# Patient Record
Sex: MALE | Race: WHITE | NOT HISPANIC OR LATINO | Employment: OTHER | URBAN - METROPOLITAN AREA
[De-identification: names, ages, dates, MRNs, and addresses within clinical notes are randomized per-mention and may not be internally consistent; named-entity substitution may affect disease eponyms.]

---

## 2017-01-14 ENCOUNTER — COMMUNICATION - HEALTHEAST (OUTPATIENT)
Dept: FAMILY MEDICINE | Facility: CLINIC | Age: 77
End: 2017-01-14

## 2017-01-14 DIAGNOSIS — G47.00 INSOMNIA: ICD-10-CM

## 2017-01-23 ENCOUNTER — COMMUNICATION - HEALTHEAST (OUTPATIENT)
Dept: FAMILY MEDICINE | Facility: CLINIC | Age: 77
End: 2017-01-23

## 2017-01-24 RX ORDER — TRAZODONE HYDROCHLORIDE 100 MG/1
TABLET ORAL
Qty: 180 TABLET | Refills: 0 | Status: SHIPPED | OUTPATIENT
Start: 2017-01-24

## 2017-05-09 ENCOUNTER — COMMUNICATION - HEALTHEAST (OUTPATIENT)
Dept: SCHEDULING | Facility: CLINIC | Age: 77
End: 2017-05-09

## 2018-03-19 ENCOUNTER — MEDICAL CORRESPONDENCE (OUTPATIENT)
Dept: HEALTH INFORMATION MANAGEMENT | Facility: CLINIC | Age: 78
End: 2018-03-19

## 2018-04-04 ENCOUNTER — RADIANT APPOINTMENT (OUTPATIENT)
Dept: CARDIOLOGY | Facility: CLINIC | Age: 78
End: 2018-04-04
Payer: COMMERCIAL

## 2018-04-04 DIAGNOSIS — R06.00 DYSPNEA, UNSPECIFIED: ICD-10-CM

## 2020-03-29 ENCOUNTER — VIRTUAL VISIT (OUTPATIENT)
Dept: FAMILY MEDICINE | Facility: OTHER | Age: 80
End: 2020-03-29

## 2020-03-29 NOTE — PROGRESS NOTES
"Date: 2020 18:15:23  Clinician: Aubrie Singh  Clinician NPI: [id number]  Patient: Aidan Colmenares  Patient : 1940  Patient Address: [address] [place] [address]  Patient Phone: [phone]  Visit Protocol: URI  Patient Summary:  Aidan is a 79 year old ( : 1940 ) male who initiated a Visit for COVID-19 (Coronavirus) evaluation and screening. When asked the question \"Please sign me up to receive news, health information and promotions from [employer].\", Aidan responded \"No\".    Aidan states his symptoms started gradually 3-6 days ago.   His symptoms consist of rhinitis, a headache, myalgia, chills, a sore throat, a cough, nasal congestion, and malaise. He is experiencing mild difficulty breathing with activities but can speak normally in full sentences. Aidan also feels feverish.   Symptom details     Nasal secretions: The color of his mucus is clear.    Cough: Aidan coughs every 5-10 minutes and his cough is more bothersome at night. Phlegm does not come into his throat when he coughs. He believes his cough is caused by post-nasal drip.     Sore throat: Aidan reports having moderate throat pain (4-6 on a 10 point pain scale), does not have exudate on his tonsils, and can swallow liquids. He is not sure if the lymph nodes in his neck are enlarged. A rash has not appeared on the skin since the sore throat started.     Temperature: His current temperature is 100.3 degrees Fahrenheit. Aidan has had a temperature over 100 degrees Fahrenheit for 1-2 days.     Headache: He states the headache is moderate (4-6 on a 10 point pain scale).      Aidan denies having ear pain, teeth pain, facial pain or pressure, and wheezing. He also denies taking antibiotic medication for the symptoms, having recent facial or sinus surgery in the past 60 days, and double sickening (worsening symptoms after initial improvement).   Precipitating events  Within the past week, Aidan has not been exposed to someone with strep throat. " He has not recently been exposed to someone with influenza. Aidan has been in close contact with the following high risk individuals: adults 65 or older.   Pertinent COVID-19 (Coronavirus) information  Aidan has not traveled internationally or to the areas where COVID-19 (Coronavirus) is widespread, including cruise ship travel in the last 14 days before the start of his symptoms.   Aidan has not had a close contact with a laboratory-confirmed COVID-19 patient within 14 days of symptom onset. He also has not had a close contact with a suspected COVID-19 patient within 14 days of symptom onset.   Aidan is not a healthcare worker or a  and does not work in a healthcare facility. He does not live with a healthcare worker.   Pertinent medical history  Aidan had 1 sinus infection within the past year.   Aidan does not need a return to work/school note.   Weight: 190 lbs   Aidan does not smoke or use smokeless tobacco.   Weight: 190 lbs    MEDICATIONS: No current medications, ALLERGIES: NKDA  Clinician Response:  Dear Aidan,  Based on the information provided, you have a viral upper respiratory infection, otherwise known as a cold. Symptoms vary from person to person, but can include sneezing, coughing, a runny nose, sore throat, and headache and range from mild to severe.  Unfortunately, there are no medications that can cure a cold, so treatment is focused on controlling symptoms as much as possible. Most people gradually feel better until symptoms are gone in 1-2 weeks.  Medication information  Because you have a viral infection, antibiotics will not help you get better. Treating a viral infection with antibiotics could actually make you feel worse.  Unless you are allergic to the over-the-counter medication(s) below, I recommend using:       Acetaminophen (Tylenol or store brand) oral tablet. Take 1-2 tablets by mouth every 4-6 hours to help with the discomfort.      Dextro polistirex (Delsym or store brand).  This medication is a cough suppressant that works by decreasing the feeling of needing to cough. Adults and children over 12 years old, take 10 ml by mouth every 12 hours as needed. Children ages 6 - 11, take 5 ml by mouth every 12 hours as needed. Children ages 4 - 5, take 2.5 ml by mouth every 12 hours as needed.     Over-the-counter medications do not require a prescription. Ask the pharmacist if you have any questions.  Self care  Steps you can take to be as comfortable as possible:     Rest.    Drink plenty of fluids.    Take a warm shower to loosen congestion    Use a cool-mist humidifier.    Use throat lozenges.    Suck on frozen items such as popsicles.    Drink hot tea with lemon and honey.    Gargle with warm salt water (1/4 teaspoon of salt per 8 ounce glass of water).    Take a spoonful of honey to reduce your cough.     When to seek care  Please be seen in a clinic or urgent care if new symptoms develop, or symptoms become worse.  Call 911 or go to the emergency room if you feel that your throat is closing off, you suddenly develop a rash, you are unable to swallow fluids, you are drooling, or you are having difficulty breathing.  COVID-19 (Coronavirus) General Information  With the increase in the number of COVID-19 (Coronavirus) cases, we understand you may have some questions. Below is some helpful information on COVID-19 (Coronavirus).  How can I protect myself and others from the COVID-19 (Coronavirus)?  Because there is currently no vaccine to prevent infection, the best way to protect yourself is to avoid being exposed to this virus. Put distance between yourself and other people if COVID-19 (Coronavirus) is spreading in your community. The virus is thought to spread mainly from person-to-person.     Between people who are in close contact with one another (within about 6 about) for a prolonged period (10 minutes or longer).    Through respiratory droplets produced when an infected person coughs  or sneezes.     The CDC recommends the following additional steps to protect yourself and others:     Wash your hands often with soap and water for at least 20 seconds, especially after blowing your nose, coughing, or sneezing; going to the bathroom; and before eating or preparing food.  Use an alcohol-based hand  that contains at least 60 percent alcohol if soap and water are not available.        Avoid touching your eyes, nose and mouth with unwashed hands.    Avoid close contact with people who are sick.    Stay home when you are sick.    Cover your cough or sneeze with a tissue, then throw the tissue in the trash.    Clean and disinfect frequently touched objects and surfaces.     You can help stop COVID-19 (Coronavirus) by knowing the signs and symptoms:     Fever    Cough    Shortness of breath     Contact your healthcare provider if   Develop symptoms   AND   Have been in close contact with a person known to have COVID-19 (Coronavirus) or live in or have recently traveled from an area with ongoing spread of COVID-19 (Coronavirus). Call ahead before you go to a doctor's office or emergency room. Tell them about your recent travel and your symptoms.   For the most up to date information, visit the CDC's website.  Self-monitoring  Self-monitoring means people should monitor themselves for fever by taking their temperatures twice a day and remain alert for a cough or difficulty breathing.  It is important to check your health two times each day for 14 days after a potential exposure to a person with COVID-19 (Coronavirus) or after travel from a location where COVID-19 (Coronavirus) is widespread. If you have been exposed to a person with COVID-19 (Coronavirus), it may take up to 14 days to know if you will get sick. Follow the steps below to check and record your health.     Take your temperature with a thermometer twice a day, once in the morning and once in the evening, and watch for a cough or  difficulty breathing for 14 days.    Write down your temperature and any COVID-19 symptoms you may have: feeling feverish, coughing, or difficulty breathing.    Stay home from work or school.    Do not take public transportation, taxis, or ride-shares.    Avoid crowded places (such as shopping centers and movie theaters) and limit your activities in public.    Keep your distance from others (about 6 feet or 2 meters).    If you get sick with fever, cough, or trouble breathing, contact your healthcare provider and tell them about your recent travel and/or your symptoms.    If you need to seek medical care for other reasons, such as dialysis, call ahead to your doctor and tell them about your recent travel.     Steps to help prevent the spread of COVID-19 (Coronavirus) if you are sick  If you are sick with COVID-19 (Coronavirus) or suspect you are infected with the virus that causes COVID-19 (Coronavirus), follow the steps below to help prevent the disease from spreading&nbsp;to people in your home and community.     Stay home except to get medical care. Home isolation may be started in consultation with your healthcare clinician.    Separate yourself from other people and animals in your home.    Call ahead before visiting your doctor if you have a medical appointment.    Wear a facemask when you are around other people.    Cover your cough and sneezes.    Clean your hands often.    Avoid sharing personal household items.    Clean and disinfect frequently touched objects and surfaces everyday.    You will need to have someone drop off medications or household supplies (if needed) at your house without coming inside or in contact with you or others living in your house.    Monitor your symptoms and seek prompt medical care if your illness is worsening (e.g. Difficulty breathing).    Discontinue home isolation only in consultation with your healthcare provider.     For more detailed and up to date information on what  "to do if you are sick, visit this link: What to Do If You Are Sick With COVID-19.  Do I need to be tested for COVID-19 (Coronavirus)?     Not everyone needs to be tested for COVID-19 (Coronavirus). Decisions on which patients receive testing will be based on the local spread of COVID-19 (Coronavirus) as well as the symptoms. Your healthcare provider will make the final decision on whether you should be tested.    In the meantime, if you have concerns that you may have been exposed, it is reasonable to practice \"social distancing.\"&nbsp; If you are ill with a cold or flu-like illness, please monitor your symptoms and call your healthcare provider if your symptoms worsen.    For more up to date information, visit this link: COVID-19 (Coronavirus) Frequently Asked Questions and Answers.      Diagnosis: Viral URI  Diagnosis ICD: J06.9  Addendum created: March 30 02:54:48, 2020 created by: Dulce Campbell body: Try Robitussin, delsym. I sent a prescription for tessalon pearles to try that as well.  Addendum created: March 30 02:54:00, 2020 created by: Dulce Campbell body: Tessalon pearles 100 mg 3 times a day x 7 days no refills.  "

## 2020-03-31 ENCOUNTER — COMMUNICATION - HEALTHEAST (OUTPATIENT)
Dept: SCHEDULING | Facility: CLINIC | Age: 80
End: 2020-03-31

## 2020-04-11 ENCOUNTER — COMMUNICATION - HEALTHEAST (OUTPATIENT)
Dept: SCHEDULING | Facility: CLINIC | Age: 80
End: 2020-04-11

## 2021-05-25 ENCOUNTER — RECORDS - HEALTHEAST (OUTPATIENT)
Dept: ADMINISTRATIVE | Facility: CLINIC | Age: 81
End: 2021-05-25

## 2021-05-27 ENCOUNTER — RECORDS - HEALTHEAST (OUTPATIENT)
Dept: ADMINISTRATIVE | Facility: CLINIC | Age: 81
End: 2021-05-27

## 2021-05-31 ENCOUNTER — RECORDS - HEALTHEAST (OUTPATIENT)
Dept: ADMINISTRATIVE | Facility: CLINIC | Age: 81
End: 2021-05-31

## 2021-06-01 ENCOUNTER — RECORDS - HEALTHEAST (OUTPATIENT)
Dept: ADMINISTRATIVE | Facility: CLINIC | Age: 81
End: 2021-06-01

## 2021-06-07 NOTE — TELEPHONE ENCOUNTER
[facility] said  [facility]. On April 2nd, diagnosed possible COVID. No temp for three days. He wants to know how long contagious for. Discussed new virus. Go by CDC guidelines. She verbalized understanding.     COVID 19 Nurse Triage Plan/Patient Instructions    Please be aware that novel coronavirus (COVID-19) may be circulating in the community. If you develop symptoms such as fever, cough, or SOB or if you have concerns about the presence of another infection including coronavirus (COVID-19), please contact your health care provider or visit [email or web address].     Disposition/Instructions    Patient to stay at home and follow home care protocol based instructions.    Thank you for limiting contact with others, wearing a simple mask to cover your cough, practice good hand hygiene habits and accessing our virtual services where possible to limit the spread of this virus.    For more information about COVID19 and options for caring for yourself at home, please visit the CDC website at https://www.cdc.gov/coronavirus/2019-ncov/about/steps-when-sick.html  For more options for care at [facility], please visit our website at [email or web address]    For more information, please use the [place] Department of Health ([facility]) COVID-19 Hotlines (Interpreters available):     Health questions: Phone Number: [phone] or [phone] and Hours: 7 a.m. to 7 p.m.    Schools and  questions: Phone Number: [phone] or [phone] and Hours 7 a.m. to 7 p.m.                    Reason for Disposition    HIGH RISK patient (e.g., age > 64 years, diabetes, heart or lung disease, weak immune system)     Seen by his docotr, has conference call set up. Will call back PCP sooner if needed.    Protocols used: CORONAVIRUS (COVID-19) DIAGNOSED OR [id number] 3.30.20

## 2021-06-07 NOTE — TELEPHONE ENCOUNTER
Patient wife calling. She is reporting , that her  has been ill with an URI for 1 week.  She reports his symptoms are Cough and temp. She states   It is a dry cough, and  interupts, that he only coughs when he  Is lying down or drinking cold liquids.    Wife explains that she already went on [email or web address], and reported his symptoms, and was advised to have him take   Cough syrup and tylenol.  She reports, he is not getting any better,and wants something else done.  She is yelling at writer while asking for this, and is emotionally upset because her  she states, is in denial , and is unwilling to deal with this. She is concerned that he really has COVID 19.    She reveals, [facility] and he [facility] not been seen or had care here at  since 2017.    [facility] call [facility], and [facility] since he is a patient there presently.  Wife agreed to do that,    Shannan Brenner RN  [facility] Triage/refill nurse

## 2021-08-24 ENCOUNTER — TRANSFERRED RECORDS (OUTPATIENT)
Dept: HEALTH INFORMATION MANAGEMENT | Facility: CLINIC | Age: 81
End: 2021-08-24

## 2022-08-29 ENCOUNTER — TRANSFERRED RECORDS (OUTPATIENT)
Dept: HEALTH INFORMATION MANAGEMENT | Facility: CLINIC | Age: 82
End: 2022-08-29

## 2022-08-29 LAB
ALT SERPL-CCNC: 22 U/L (ref 13–61)
AST SERPL-CCNC: 19 U/L (ref 15–37)
CHOLESTEROL (EXTERNAL): 116 MG/DL (ref 0–200)
CREATININE (EXTERNAL): 1.2 MG/DL (ref 0.7–1.2)
GLUCOSE (EXTERNAL): 95 MG/DL (ref 74–106)
HDLC SERPL-MCNC: 42 MG/DL
LDL CHOLESTEROL CALCULATED (EXTERNAL): 59 MG/DL
NON HDL CHOLESTEROL (EXTERNAL): 74 MG/DL
POTASSIUM (EXTERNAL): 3.3 MMOL/L (ref 3.5–5)

## 2022-09-01 ENCOUNTER — TRANSFERRED RECORDS (OUTPATIENT)
Dept: HEALTH INFORMATION MANAGEMENT | Facility: CLINIC | Age: 82
End: 2022-09-01

## 2022-09-08 ENCOUNTER — TRANSCRIBE ORDERS (OUTPATIENT)
Dept: OTHER | Age: 82
End: 2022-09-08

## 2022-09-08 DIAGNOSIS — M48.061 SPINAL STENOSIS, LUMBAR REGION, WITHOUT NEUROGENIC CLAUDICATION: Primary | ICD-10-CM

## 2022-09-16 NOTE — TELEPHONE ENCOUNTER
Action 9/16/22 MV 9.49am   Action Taken Records and imaging request faxed to [facility]    9/23/22 MV 12.59pm  1) Recs received from [facility] and sent to scanning   2) imaging request faxed to 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 Neurosurgery:  9/1/22   MEDICATION LIST Received    IMAGING  (FOR ALL VISITS)     MRI (HEAD, NECK, SPINE) Received [facility]:  MRI LumbaR Spine 1/29/21   XRAY (SPINE) *NEUROSURGERY* Received [facility]:  XR Lumbar Spine 5/22/20

## 2022-09-26 ENCOUNTER — TELEPHONE (OUTPATIENT)
Dept: NEUROSURGERY | Facility: CLINIC | Age: 82
End: 2022-09-26

## 2022-09-26 DIAGNOSIS — M48.061 LUMBAR STENOSIS: Primary | ICD-10-CM

## 2022-09-27 ENCOUNTER — OFFICE VISIT (OUTPATIENT)
Dept: NEUROSURGERY | Facility: CLINIC | Age: 82
End: 2022-09-27
Payer: COMMERCIAL

## 2022-09-27 ENCOUNTER — ANCILLARY PROCEDURE (OUTPATIENT)
Dept: GENERAL RADIOLOGY | Facility: CLINIC | Age: 82
End: 2022-09-27
Attending: NEUROLOGICAL SURGERY
Payer: COMMERCIAL

## 2022-09-27 ENCOUNTER — PRE VISIT (OUTPATIENT)
Dept: NEUROSURGERY | Facility: CLINIC | Age: 82
End: 2022-09-27

## 2022-09-27 VITALS
WEIGHT: 195 LBS | DIASTOLIC BLOOD PRESSURE: 76 MMHG | HEART RATE: 53 BPM | HEIGHT: 70 IN | SYSTOLIC BLOOD PRESSURE: 115 MMHG | BODY MASS INDEX: 27.92 KG/M2 | OXYGEN SATURATION: 96 %

## 2022-09-27 DIAGNOSIS — M54.41 CHRONIC BILATERAL LOW BACK PAIN WITH BILATERAL SCIATICA: ICD-10-CM

## 2022-09-27 DIAGNOSIS — G89.29 CHRONIC BILATERAL LOW BACK PAIN WITH BILATERAL SCIATICA: ICD-10-CM

## 2022-09-27 DIAGNOSIS — M48.062 SPINAL STENOSIS OF LUMBAR REGION WITH NEUROGENIC CLAUDICATION: Primary | ICD-10-CM

## 2022-09-27 DIAGNOSIS — M54.42 CHRONIC BILATERAL LOW BACK PAIN WITH BILATERAL SCIATICA: ICD-10-CM

## 2022-09-27 DIAGNOSIS — M48.061 LUMBAR STENOSIS: ICD-10-CM

## 2022-09-27 PROCEDURE — 77073 BONE LENGTH STUDIES: CPT | Performed by: STUDENT IN AN ORGANIZED HEALTH CARE EDUCATION/TRAINING PROGRAM

## 2022-09-27 PROCEDURE — 99203 OFFICE O/P NEW LOW 30 MIN: CPT | Performed by: PHYSICIAN ASSISTANT

## 2022-09-27 PROCEDURE — 72082 X-RAY EXAM ENTIRE SPI 2/3 VW: CPT | Performed by: STUDENT IN AN ORGANIZED HEALTH CARE EDUCATION/TRAINING PROGRAM

## 2022-09-27 RX ORDER — ATORVASTATIN CALCIUM 80 MG/1
TABLET, FILM COATED ORAL
COMMUNITY
Start: 2022-05-01

## 2022-09-27 RX ORDER — GABAPENTIN 300 MG/1
CAPSULE ORAL PRN
COMMUNITY
Start: 2022-07-20 | End: 2022-11-09

## 2022-09-27 RX ORDER — LOSARTAN POTASSIUM 100 MG/1
1 TABLET ORAL DAILY
COMMUNITY
Start: 2022-01-23

## 2022-09-27 RX ORDER — HYDROCHLOROTHIAZIDE 25 MG/1
12.5 TABLET ORAL DAILY
COMMUNITY
Start: 2022-06-12

## 2022-09-27 ASSESSMENT — PAIN SCALES - GENERAL: PAINLEVEL: SEVERE PAIN (7)

## 2022-09-27 NOTE — PATIENT INSTRUCTIONS
Dr Shoemaker has entered orders for an updated MRI of your lumbar spine as well as a referral to [facility] in [place] for initial conservative management.   Members of our scheduling team will assist you in scheduling these appointments.

## 2022-09-27 NOTE — LETTER
Date:September 30, 2022      Provider requested that no letter be sent. Do not send.       [facility]

## 2022-09-27 NOTE — LETTER
"9/27/2022       RE: Sudhakar Colmenares  [address] [address]     Dear Colleague,    Thank you for referring your patient, Sudhakar Colmenares, to the [facility] [place]. Please see a copy of my visit note below.        Neurosurgery Clinic Note    Chief Complaint: low back pain    History of Present Illness:  It was a pleasure to evaluate Sudhakar Colmenares in clinic today at the kind referral of Referred Self, MD    Sudhakar Colmenares is a 82 year old male presenting with complaints of left low back pain extending into his outer hip area.  He describes the pain as a \"bolt\" of pain that causes urinary incontinence and leg weakness. This can occur for no particular reason.  He also describes issues with getting up from the ground.  He needs to get on all fours and walk himself up on either his legs or some other object.  He denies pain in his right leg/hip.  He does notice that bending makes his pain worse, sitting is difficult, laying is best.  When he stands he notes that he feels a heaviness or something pulling his left side down.       He has had three laminectomy surgeries at the L3/4 area in 2002/2003 by [facility], which improved his left hip pain. He has undergone multiple steroid injections at L3/4, the first in Oct 2020 with about 6 months relief, the 2nd in May 2020 w/ marginal relief, the 3rd in Oct 2021 w/o any significant or lasting relief.  He has not had any recent PT.      Review of Systems  Constitutional: Negative for activity change, appetite change, chills, fatigue, fever and unexpected weight change.   HENT: Negative for trouble swallowing.    Eyes: Negative for visual disturbance.   Respiratory: Negative for shortness of breath.    Cardiovascular: Negative for leg swelling.  Gastrointestinal: Negative for abdominal pain, nausea and vomiting.   Endocrine: Negative for cold " intolerance.  Genitourinary: Positive for incontinence, frequency and urgency.   Musculoskeletal: Positive for back pain. Negative for gait problem, neck pain and neck stiffness.  Skin: Negative for color change  Allergic/Immunologic: Negative for immunocompromised state.  Neurological: Negative for tremors, speech difficulty, + left leg weakness, + left tibial numbness Neg. headaches.   Hematological: Does not bruise/bleed easily.   Psychiatric/Behavioral: The patient is not nervous/anxious.     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        IMAGING:  Imaging independently reviewed and discussed with Dr. Shoemaker.    MRI Lumbar 1/29/21 - with mild/mod L4/5 left FS, left L5/S1 disc osteophyte complex w/ facet arthrosis.  There is likely left L4 NR impingement at L4/5 level and possible L5, S1 NR impingement at the L5/S1 level. Prior laminectomy L3/4.    EOS 9/27/22 - no significant malalignment    Resulted Imaging/Labs:  Bone Density:  No results found.    Vitamin D:  Vitamin D Deficiency Screening Results:  No results found for: VITDT  No results found for: JYB296, KXQK230, DMPQ22URPYN, VITD3, D2VIT, D3VIT, DTOT, [id number], [id number], [id number], [id number], [id number], [id number]      Nutritional Status:  Estimated body mass index is 27.44 kg/m  as calculated from the following:    Height as  "of 4/19/16: 1.791 m (5' 10.5\").    Weight as of 4/19/16: 88 kg (194 lb).    No results found for: ALBUMIN    Diabetes Screening:  No results found for: A1C    Nicotine Usage:  No       Physical Exam   There were no vitals taken for this visit.  Constitutional: Oriented to person, place, and time. Appears well-developed and well-nourished. Cooperative. No distress.   HENT:   Head: Normocephalic and atraumatic.   Eyes: Conjunctivae are normal.  Neck: Normal range of motion. Neck supple. No spinous process tenderness and no muscular tenderness present. No tracheal deviation present.  Cardiovascular: Normal rate and regular rhythm.    Pulmonary/Chest: Effort normal and breath sounds normal.  Abdominal: Soft. Bowel sounds are normal. Exhibits no distension. There is no tenderness.   Musculoskeletal:   Cervical flexion-extension range of motion  Lumbar flexion/extension range of motion, +left lower back and hip pain with facet loading.  +left trendelburg sign    Neurological: alert and oriented to person, place, and time.   No cranial nerve deficit   sensory deficit in left tibialis.  Gait normal, symmetrical    Reflex Scores:        Bicep reflexes are 2+ on the right side and 2+ on the left side.       Brachioradialis reflexes are 2+ on the right side and 2+ on the left side.       Patellar reflexes are 2+ on the right side and 1+ on the left side.       Achilles reflexes are 1+ on the right side and 1+ on the left side.    STRENGTH LEFT RIGHT   Deltoid 5 5   Bicep 5 5   Wrist Extensor 5 5   Tricep 5 5   Finger flexion 5 5   Finger abduction 5 5    5 5       Hip Flexion     4+     5   Knee Extension 5 5   Ankle Dorsiflexion 5 5   Extensor Hallucis Longus 5 5   Plantar Flexion 5 5   Foot eversion 5 5   Foot inversion 4 5     No Lhermitte's, No Spurling's  No ankle clonus      Sacroiliac Joint Exam LEFT RIGHT   Liliana Finger Test - -   PSIS tenderness - -   ThighThrust - -   WALTER +left outer hip, tight -   Pelvic " Gapping - -   Pelvic Compression - -   Gaenslen s +left out hip, tight -   Sacral Thrust - -     Tight hamstrings on straight leg test, but negative for back pain L>R.    Skin: Skin is warm, dry and intact. Midline surgical incision is well-healed in mid/low back.  Psychiatric: Normal mood and affect. Speech is normal and behavior is normal.        ASSESSMENT:  Sudhakar Colmenares is a 82 year old male with chronic, recurrent left low back pain w/ extension into the left outer hip s/p 3 back surgeries and multiple steroid injections at the L3/4 level.  His MRI is old (1/2021) and his symptoms have changed since he had the MRI.  He notes bladder incontinence and more severe pain since 1 year ago, as well as difficulty getting up off the floor.  EOS today is negative or any concerning spinal malalignment. He has not had recent PT, injections or other conservative treatment.      PLAN:    -Obtain updated lumbar MRI.  -Referral to PM&R for eval and treatment  -We are happy to to see patient back if there are surgical options in the future.      Patient seen and discussed with Dr. Shoemaker.  Patient agrees with the plan as outlined above.      Katiuska Agarwal PA-C  Department of Neurosurgery  Office: [phone]            Again, thank you for allowing me to participate in the care of your patient.      Sincerely,    Gia Shoemaker MD

## 2022-09-27 NOTE — PROGRESS NOTES
"    Neurosurgery Clinic Note    Chief Complaint: low back pain    History of Present Illness:  It was a pleasure to evaluate Sudhakar Colmenares in clinic today at the kind referral of Referred Self, MD    Sudhakar Colmenares is a 82 year old male presenting with complaints of left low back pain extending into his outer hip area.  He describes the pain as a \"bolt\" of pain that causes urinary incontinence and leg weakness. This can occur for no particular reason.  He also describes issues with getting up from the ground.  He needs to get on all fours and walk himself up on either his legs or some other object.  He denies pain in his right leg/hip.  He does notice that bending makes his pain worse, sitting is difficult, laying is best.  When he stands he notes that he feels a heaviness or something pulling his left side down.       He has had three laminectomy surgeries at the L3/4 area in 2002/2003 by [facility], which improved his left hip pain. He has undergone multiple steroid injections at L3/4, the first in Oct 2020 with about 6 months relief, the 2nd in May 2020 w/ marginal relief, the 3rd in Oct 2021 w/o any significant or lasting relief.  He has not had any recent PT.      Review of Systems  Constitutional: Negative for activity change, appetite change, chills, fatigue, fever and unexpected weight change.   HENT: Negative for trouble swallowing.    Eyes: Negative for visual disturbance.   Respiratory: Negative for shortness of breath.    Cardiovascular: Negative for leg swelling.  Gastrointestinal: Negative for abdominal pain, nausea and vomiting.   Endocrine: Negative for cold intolerance.  Genitourinary: Positive for incontinence, frequency and urgency.   Musculoskeletal: Positive for back pain. Negative for gait problem, neck pain and neck stiffness.  Skin: Negative for color change  Allergic/Immunologic: Negative for immunocompromised state.  Neurological: Negative for tremors, speech difficulty, + " "left leg weakness, + left tibial numbness Neg. headaches.   Hematological: Does not bruise/bleed easily.   Psychiatric/Behavioral: The patient is not nervous/anxious.     Past medical history:  Prostate cancer s/p partial prostatectomy   Left LE vein sacrifice for cardiac bypass  Achilles tendon tear/repair    Past Surgical History:   Procedure Laterality Date    [facility] REPAIR ACHILLES TENDON,PRIMARY      Description: Primary Repair Of Ruptured Achilles Tendon;  Recorded: 01/22/2014;    WI CYSTOSCOPY,REMV URETERAL STONE      Description: Cystoscopy With Removal Of Ureteral Calculus;  Proc Date: 02/01/2004;  Comments: Left Urethral stone  3mm diameter       Social History     Socioeconomic History    Marital status:    Tobacco Use    Smoking status: Never Smoker       family history includes Coronary Artery Disease in his mother.        IMAGING:  Imaging independently reviewed and discussed with Dr. Shoemaker.    MRI Lumbar 1/29/21 - with mild/mod L4/5 left FS, left L5/S1 disc osteophyte complex w/ facet arthrosis.  There is likely left L4 NR impingement at L4/5 level and possible L5, S1 NR impingement at the L5/S1 level. Prior laminectomy L3/4.    EOS 9/27/22 - no significant malalignment    Resulted Imaging/Labs:  Bone Density:  No results found.    Vitamin D:  Vitamin D Deficiency Screening Results:  No results found for: VITDT  No results found for: SCC331, OWAD410, LXIZ88OATQZ, VITD3, D2VIT, D3VIT, DTOT, [id number], [id number], [id number], [id number], [id number], [id number]      Nutritional Status:  Estimated body mass index is 27.44 kg/m  as calculated from the following:    Height as of 4/19/16: 1.791 m (5' 10.5\").    Weight as of 4/19/16: 88 kg (194 lb).    No results found for: ALBUMIN    Diabetes Screening:  No results found for: A1C    Nicotine Usage:  No       Physical Exam   There were no vitals taken for this visit.  Constitutional: Oriented to person, place, and time. Appears well-developed and " well-nourished. Cooperative. No distress.   HENT:   Head: Normocephalic and atraumatic.   Eyes: Conjunctivae are normal.  Neck: Normal range of motion. Neck supple. No spinous process tenderness and no muscular tenderness present. No tracheal deviation present.  Cardiovascular: Normal rate and regular rhythm.    Pulmonary/Chest: Effort normal and breath sounds normal.  Abdominal: Soft. Bowel sounds are normal. Exhibits no distension. There is no tenderness.   Musculoskeletal:   Cervical flexion-extension range of motion  Lumbar flexion/extension range of motion, +left lower back and hip pain with facet loading.  +left trendelburg sign    Neurological: alert and oriented to person, place, and time.   No cranial nerve deficit   sensory deficit in left tibialis.  Gait normal, symmetrical    Reflex Scores:        Bicep reflexes are 2+ on the right side and 2+ on the left side.       Brachioradialis reflexes are 2+ on the right side and 2+ on the left side.       Patellar reflexes are 2+ on the right side and 1+ on the left side.       Achilles reflexes are 1+ on the right side and 1+ on the left side.    STRENGTH LEFT RIGHT   Deltoid 5 5   Bicep 5 5   Wrist Extensor 5 5   Tricep 5 5   Finger flexion 5 5   Finger abduction 5 5    5 5       Hip Flexion     4+     5   Knee Extension 5 5   Ankle Dorsiflexion 5 5   Extensor Hallucis Longus 5 5   Plantar Flexion 5 5   Foot eversion 5 5   Foot inversion 4 5     No Lhermitte's, No Spurling's  No ankle clonus      Sacroiliac Joint Exam LEFT RIGHT   Liliana Finger Test - -   PSIS tenderness - -   ThighThrust - -   WALTER +left outer hip, tight -   Pelvic Gapping - -   Pelvic Compression - -   Gaenslen s +left out hip, tight -   Sacral Thrust - -     Tight hamstrings on straight leg test, but negative for back pain L>R.    Skin: Skin is warm, dry and intact. Midline surgical incision is well-healed in mid/low back.  Psychiatric: Normal mood and affect. Speech is normal and  behavior is normal.        ASSESSMENT:  Sudhakar Colmenares is a 82 year old male with chronic, recurrent left low back pain w/ extension into the left outer hip s/p 3 back surgeries and multiple steroid injections at the L3/4 level.  His MRI is old (1/2021) and his symptoms have changed since he had the MRI.  He notes bladder incontinence and more severe pain since 1 year ago, as well as difficulty getting up off the floor.  EOS today is negative or any concerning spinal malalignment. He has not had recent PT, injections or other conservative treatment.      PLAN:    -Obtain updated lumbar MRI.  -Referral to PM&R for eval and treatment  -We are happy to to see patient back if there are surgical options in the future.      Patient seen and discussed with Dr. Shoemaker.  Patient agrees with the plan as outlined above.      Katiuska Agarwal PA-C  Department of Neurosurgery  Office: [phone]    I have seen this patient with the PA and formulated a plan and agree with this note.  AMP

## 2022-10-01 ENCOUNTER — HOSPITAL ENCOUNTER (OUTPATIENT)
Dept: MRI IMAGING | Facility: CLINIC | Age: 82
Discharge: HOME | End: 2022-10-01
Attending: NEUROLOGICAL SURGERY | Admitting: NEUROLOGICAL SURGERY
Payer: COMMERCIAL

## 2022-10-01 DIAGNOSIS — M48.062 SPINAL STENOSIS OF LUMBAR REGION WITH NEUROGENIC CLAUDICATION: ICD-10-CM

## 2022-10-01 DIAGNOSIS — M54.41 CHRONIC BILATERAL LOW BACK PAIN WITH BILATERAL SCIATICA: ICD-10-CM

## 2022-10-01 DIAGNOSIS — G89.29 CHRONIC BILATERAL LOW BACK PAIN WITH BILATERAL SCIATICA: ICD-10-CM

## 2022-10-01 DIAGNOSIS — M54.42 CHRONIC BILATERAL LOW BACK PAIN WITH BILATERAL SCIATICA: ICD-10-CM

## 2022-10-01 PROCEDURE — 72148 MRI LUMBAR SPINE W/O DYE: CPT

## 2022-10-26 ENCOUNTER — OFFICE VISIT (OUTPATIENT)
Dept: PHYSICAL MEDICINE AND REHAB | Facility: CLINIC | Age: 82
End: 2022-10-26
Attending: NEUROLOGICAL SURGERY
Payer: COMMERCIAL

## 2022-10-26 VITALS
DIASTOLIC BLOOD PRESSURE: 66 MMHG | HEIGHT: 70 IN | BODY MASS INDEX: 28.06 KG/M2 | SYSTOLIC BLOOD PRESSURE: 122 MMHG | WEIGHT: 196 LBS | HEART RATE: 53 BPM

## 2022-10-26 DIAGNOSIS — M47.816 LUMBAR FACET ARTHROPATHY: ICD-10-CM

## 2022-10-26 DIAGNOSIS — M54.16 LUMBAR RADICULAR PAIN: Primary | ICD-10-CM

## 2022-10-26 DIAGNOSIS — M48.061 FORAMINAL STENOSIS OF LUMBAR REGION: ICD-10-CM

## 2022-10-26 DIAGNOSIS — M48.061 STENOSIS OF LATERAL RECESS OF LUMBAR SPINE: ICD-10-CM

## 2022-10-26 PROCEDURE — 99204 OFFICE O/P NEW MOD 45 MIN: CPT | Performed by: PHYSICAL MEDICINE & REHABILITATION

## 2022-10-26 ASSESSMENT — PAIN SCALES - GENERAL: PAINLEVEL: SEVERE PAIN (7)

## 2022-10-26 NOTE — LETTER
10/26/2022         RE: Sudhakar Colmenares  [address] [address]        Dear Colleague,    Thank you for referring your patient, Sudhakar Colmenares, to the [facility]. Please see a copy of my visit note below.    Assessment/Plan:      Sudhakar was seen today for back pain.    Diagnoses and all orders for this visit:    Lumbar radicular pain  -     Physical Therapy Referral; Future  -     Pain Transforaminal Lissy Inj Lmbscrl 1 Lvl Lt; Future    Foraminal stenosis of lumbar region  -     Physical Therapy Referral; Future  -     Pain Transforaminal Lissy Inj Lmbscrl 1 Lvl Lt; Future    Stenosis of lateral recess of lumbar spine  -     Physical Therapy Referral; Future    Lumbar facet arthropathy  -     Physical Therapy Referral; Future    Other orders  -     Spine  Referral         Assessment: Pleasant 82 year old male with a history of coronary artery disease status post CABG,   atrial fibrillation, prostate cancer, status post 3 lumbar discectomies with:    1.  Chronic lumbar spine pain with a year and a half history of left lower extremity radicular pain which is worsening.  This is in an L4/5 distribution.  He has severe left foraminal stenosis L4-5 with left lateral recess stenosis at L4-5 with degenerative disc disease broad-based disc bulge and facet arthropathy.  There is also disc osteophyte complex at L5-S1 resulting in mild to moderate bilateral foraminal stenosis and left lateral recess stenosis.    2.  Severe facet arthropathy bilateral L5-S1 moderate L4-5.      Discussion:    1 I discussed the diagnosis and treatment options with the patient.  [facility] also had interlaminar epidural steroid injections.  He was seen by neurosurgery who recommended further conservative management prior to surgical intervention.  We discussed options of further injections therapy and medications.  He wants to avoid  medications.    2.  Recommend a left L4-5 transforaminal epidural steroid injection  for diagnostic and therapeutic purposes to determine the extent of the L4 nerve involvement and his pain.    3.  Start physical therapy for nerve glides core strengthening stabilization.    4 .  Can consider medial branch blocks for any remaining lumbar spine pain also can consider an EMG of left lower extremity if his symptoms are not improved with the epidural.    5.  Follow-up at his earliest convenience for injection.      It was our pleasure caring for your patient today, if there any questions or concerns please do not hesitate to contact us.      Subjective:   Patient ID: Sudhakar Colmenares is a 82 year old male.    History of Present Illness: Patient presents at the request of Dr. Shoemaker for evaluation of low back pain and left lower extremity radicular pain.  He has a history of 3 lumbar discectomies at [facility] with Dr. Novoa dating back towards 1998 and through early 2000's.  His back pain was fairly well controlled but did have intermittent issues.  About 18 months ago without any new injury began having increased back pain bilateral PSIS left greater than right into the gluteal region down the left lateral anterior thigh with numbness over the anterior shin sense of weakness at the ankle.  He has had some the paresthesias in his anterior shin since his last surgery.  His pain is constant but severely limits him with getting out of a chair prolonged sitting standing or walking better with lying down which is the only thing that improves his pain.  His pain is rated a 9/10 at worst 7/10 today 5/10 at best.    [facility]  I did review notes from neurosurgery with Dr. Shoemaker as well as [facility].  [facility].  He has had 3 lumbar epidurals I can only find records for the most recent in November 2021 L2-3 left paramedian interlaminar epidural steroid  injection.  He reports the initial injection October 2020 was very helpful for give him 6 months relief and less relief with the second injection in the third again was done in November 2021.  Prednisone burst also has been used helpful.       Imaging: MRI report and images were personally reviewed and discussed with the patient.  A plastic model was utilized during the discussion.  MRI of the lumbar spine personally reviewed. Shows prior surgical changes L5-S1.  Degenerative endplate changes throughout the lumbar spine.  Mild disc height loss of the lower lumbar spine.  L5-S1 left paracentral disc protrusion with disc osteophyte complex with lateral recess narrowing severe right moderate left facet arthropathy no central stenosis.  Moderate right and mild to moderate left foraminal stenosis.  L4-5 severe left foraminal stenosis with moderate facet arthropathy and some mild to moderate left lateral recess stenosis with broad-based disc bulge and facet arthropathy.  No central stenosis.  No central stenosis L3-4 with mild facet arthropathy no significant foraminal stenosis.       Review of Systems: Complains of ringing in the ears, change in vision, or bladder control intermittently and tells me he cannot feel  himself urinating but this is intermittent.  Complains of joint pain muscle pain muscle fatigue, skin itching, balance issues falls dizziness, bruising easily and excessive tiredness.  Denies fevers, weight loss, weight gain, eye pain, chest pain, shortness of breath, abdominal pain, nausea vomiting, headaches, painful urination, open wounds, seizures, anxiety depression. Remainder of 12 point review systems negative unless listed above.    Past Medical History:   Diagnosis Date     A-fib (H)      Cancer (H)      Heart disease        Family History   Problem Relation Age of Onset     Coronary Artery Disease Mother          Social History     Socioeconomic History     Marital status:      Spouse name:  "None     Number of children: None     Years of education: None     Highest education level: None   Tobacco Use     Smoking status: Never     Smokeless tobacco: Never     Retired.  Previously in the [employer].  .  2 children.  No tobacco.  Return to alcohol 1 beer per week.    The following portions of the patient's history were reviewed and updated as appropriate: allergies, current medications, past family history, past medical history, past social history, past surgical history and problem list.    Oswestry (FROYLAN) Questionnaire    OSWESTRY DISABILITY INDEX 10/26/2022   Count 10   Sum 23   Oswestry Score (%) 46   Some recent data might be hidden       Neck Disability Index:  No flowsheet data found.       PHQ-2 Score:     PHQ-2 ( 1999 [employer]) 9/27/2022   Q1: Little interest or pleasure in doing things 0   Q2: Feeling down, depressed or hopeless 0   PHQ-2 Score 0                  Objective:   Physical Exam:    /66   Pulse 53   Ht 5' 10\" (1.778 m)   Wt 196 lb (88.9 kg)   BMI 28.12 kg/m    Body mass index is 28.12 kg/m .      General:  Well-appearing male in no acute distress.  Pleasant, cooperative, and interactive throughout the examination and interview.  CV: No lower extremity edema on inspection or paltation.  Lymphatics: No cervical lymphadenopathy palpated. Eyes: sclera clear. Skin: No rashes or lesions seen over the head/neck, hairline, arms, legs, trunk.  Respirations unlabored.  MSK: Gait is antalgic left lower extremity mild.  Able to heel-toe walk without difficulty.  Negative Romberg.  Spine: normal AP curves of the C, T, and L spine.  Moderately reduced lumbar flexion finger to floor testing.  Palpation: Tenderness to palpation over left PSIS and gluteal tissues and sciatic notch.  Extremities: Full range of motion of the elbows, and wrists with no effusions or tenderness to palpation.   Full range of motion of the hips, knees, and ankles from seated with no effusions or tenderness to " palpation.    No hypermobility of the upper or lower extremities.  Neurologic exam: Mental status: Patient is alert and oriented with normal affect.  Attention, knowledge, memory, and language are intact.  Normal coordination throughout the examination.  Reflexes are 2+ and symmetric biceps, triceps, brachioradialis, 2+ right, trace left patellar, and trace bilateral Achilles with equivocal toes and Negative Penny's.  Sensation is decreased light touch bilateral medial malleolus, intact to light touch throughout the remainder of upper and lower extremities bilaterally.  Manual muscle testing reveals 5 out of 5 in the hip flexors, knee flexors/extensors, ankle plantar flexors, ankle  dorsiflexors, and EHL.  Upper extremities: Grossly normal strength . Normal muscle bulk and tone in the arms and legs.    Positive seated straight leg raise on the left.            Again, thank you for allowing me to participate in the care of your patient.        Sincerely,        Lg Wheatley, DO

## 2022-10-26 NOTE — PATIENT INSTRUCTIONS
A physical therapy order was provided for you today.  You will be contacted by physical therapy.  If nobody contacts you within 3 to 5 days, please contact the clinic at [phone].  It will be very important for you to do your physical therapy exercises on a regular basis to decrease your pain and prevent future pain flares.     2. A Left Lumbar epidural has been ordered today. Please schedule this injection at least  2 weeks from now to allow time for insurance prior authorization. On the day of your injection, you cannot be sick or taking antibiotics. If you become sick and are prescribed, please call the clinic so your injection can be rescheduled for once you have completed your antibiotics. You will need to bring a  with you for your injection. If you have any questions or concerns prior to your injection, please do not hesitate to call the nurse navigation line at [phone].     [facility] Injection Requirements    A  is required for all fluoroscopically-guided injections.  Injection appointments may be cancelled if there are signs/symptoms of an active infection or if the patient is being actively treated with antibiotics for a diagnosed infection.  Patients may have their steroid injection cancelled if they have had another steroid injection within 2 weeks.  Diabetic patients will have their blood glucose levels checked the day of their injection and the appointment will be rescheduled if the blood glucose level is 300 or higher.  Patients with allergies to cortisone, local anesthetics, iodine, or contrast dye should contact the Spine Center to further discuss these considerations.  Patients scheduled for medial branch block diagnostic injections should refrain from taking pain medication the day of the procedure.  The medial branch block injection appointment will be rescheduled if the patient's pain rating is not 5/10 or greater at the time of the  procedure.  Patients taking warfarin/Coumadin will have their INR checked the day of the procedure and the procedure may be rescheduled if the INR is greater than 3.0.  Please contact the Spine Center (#[phone]) if you are taking any prescription blood-thinning medications (warfarin, Plavix, Lovenox, Eliquis, Brilinta, Effient, etc.) as special dosing adjustments may need to be made depending on the type of injection you are scheduled to receive.  It is recommended that you delay having your steroid injection if you have received any vaccines within 2 weeks.

## 2022-10-26 NOTE — PROGRESS NOTES
Assessment/Plan:      Sudhakar was seen today for back pain.    Diagnoses and all orders for this visit:    Lumbar radicular pain  -     Physical Therapy Referral; Future  -     Pain Transforaminal Lissy Inj Lmbscrl 1 Lvl Lt; Future    Foraminal stenosis of lumbar region  -     Physical Therapy Referral; Future  -     Pain Transforaminal Lissy Inj Lmbscrl 1 Lvl Lt; Future    Stenosis of lateral recess of lumbar spine  -     Physical Therapy Referral; Future    Lumbar facet arthropathy  -     Physical Therapy Referral; Future    Other orders  -     Spine  Referral         Assessment: Pleasant 82 year old male with a history of coronary artery disease status post CABG,   atrial fibrillation, prostate cancer, status post 3 lumbar discectomies with:    1.  Chronic lumbar spine pain with a year and a half history of left lower extremity radicular pain which is worsening.  This is in an L4/5 distribution.  He has severe left foraminal stenosis L4-5 with left lateral recess stenosis at L4-5 with degenerative disc disease broad-based disc bulge and facet arthropathy.  There is also disc osteophyte complex at L5-S1 resulting in mild to moderate bilateral foraminal stenosis and left lateral recess stenosis.    2.  Severe facet arthropathy bilateral L5-S1 moderate L4-5.      Discussion:    1 I discussed the diagnosis and treatment options with the patient.  [facility] also had interlaminar epidural steroid injections.  He was seen by neurosurgery who recommended further conservative management prior to surgical intervention.  We discussed options of further injections therapy and medications.  He wants to avoid medications.    2.  Recommend a left L4-5 transforaminal epidural steroid injection  for diagnostic and therapeutic purposes to determine the extent of the L4 nerve involvement and his pain.    3.  Start physical therapy for nerve glides core strengthening  stabilization.    4 .  Can consider medial branch blocks for any remaining lumbar spine pain also can consider an EMG of left lower extremity if his symptoms are not improved with the epidural.    5.  Follow-up at his earliest convenience for injection.      It was our pleasure caring for your patient today, if there any questions or concerns please do not hesitate to contact us.      Subjective:   Patient ID: Sudhakar Colmenares is a 82 year old male.    History of Present Illness: Patient presents at the request of Dr. Shoemaker for evaluation of low back pain and left lower extremity radicular pain.  He has a history of 3 lumbar discectomies at [facility] with Dr. Novoa dating back towards 1998 and through early 2000's.  His back pain was fairly well controlled but did have intermittent issues.  About 18 months ago without any new injury began having increased back pain bilateral PSIS left greater than right into the gluteal region down the left lateral anterior thigh with numbness over the anterior shin sense of weakness at the ankle.  He has had some the paresthesias in his anterior shin since his last surgery.  His pain is constant but severely limits him with getting out of a chair prolonged sitting standing or walking better with lying down which is the only thing that improves his pain.  His pain is rated a 9/10 at worst 7/10 today 5/10 at best.    [facility]  I did review notes from neurosurgery with Dr. Shoemaker as well as [facility].  [facility].  He has had 3 lumbar epidurals I can only find records for the most recent in November 2021 L2-3 left paramedian interlaminar epidural steroid injection.  He reports the initial injection October 2020 was very helpful for give him 6 months relief and less relief with the second injection in the third again was done in November 2021.  Prednisone burst also has been used helpful.       Imaging: MRI  report and images were personally reviewed and discussed with the patient.  A plastic model was utilized during the discussion.  MRI of the lumbar spine personally reviewed. Shows prior surgical changes L5-S1.  Degenerative endplate changes throughout the lumbar spine.  Mild disc height loss of the lower lumbar spine.  L5-S1 left paracentral disc protrusion with disc osteophyte complex with lateral recess narrowing severe right moderate left facet arthropathy no central stenosis.  Moderate right and mild to moderate left foraminal stenosis.  L4-5 severe left foraminal stenosis with moderate facet arthropathy and some mild to moderate left lateral recess stenosis with broad-based disc bulge and facet arthropathy.  No central stenosis.  No central stenosis L3-4 with mild facet arthropathy no significant foraminal stenosis.       Review of Systems: Complains of ringing in the ears, change in vision, or bladder control intermittently and tells me he cannot feel  himself urinating but this is intermittent.  Complains of joint pain muscle pain muscle fatigue, skin itching, balance issues falls dizziness, bruising easily and excessive tiredness.  Denies fevers, weight loss, weight gain, eye pain, chest pain, shortness of breath, abdominal pain, nausea vomiting, headaches, painful urination, open wounds, seizures, anxiety depression. Remainder of 12 point review systems negative unless listed above.    Past Medical History:   Diagnosis Date     A-fib (H)      Cancer (H)      Heart disease        Family History   Problem Relation Age of Onset     Coronary Artery Disease Mother          Social History     Socioeconomic History     Marital status:      Spouse name: None     Number of children: None     Years of education: None     Highest education level: None   Tobacco Use     Smoking status: Never     Smokeless tobacco: Never     Retired.  Previously in the [employer].  .  2 children.  No tobacco.  Return to alcohol  "1 beer per week.    The following portions of the patient's history were reviewed and updated as appropriate: allergies, current medications, past family history, past medical history, past social history, past surgical history and problem list.    Oswestry (FROYLAN) Questionnaire    OSWESTRY DISABILITY INDEX 10/26/2022   Count 10   Sum 23   Oswestry Score (%) 46   Some recent data might be hidden       Neck Disability Index:  No flowsheet data found.       PHQ-2 Score:     PHQ-2 ( 1999 [employer]) 9/27/2022   Q1: Little interest or pleasure in doing things 0   Q2: Feeling down, depressed or hopeless 0   PHQ-2 Score 0                  Objective:   Physical Exam:    /66   Pulse 53   Ht 5' 10\" (1.778 m)   Wt 196 lb (88.9 kg)   BMI 28.12 kg/m    Body mass index is 28.12 kg/m .      General:  Well-appearing male in no acute distress.  Pleasant, cooperative, and interactive throughout the examination and interview.  CV: No lower extremity edema on inspection or paltation.  Lymphatics: No cervical lymphadenopathy palpated. Eyes: sclera clear. Skin: No rashes or lesions seen over the head/neck, hairline, arms, legs, trunk.  Respirations unlabored.  MSK: Gait is antalgic left lower extremity mild.  Able to heel-toe walk without difficulty.  Negative Romberg.  Spine: normal AP curves of the C, T, and L spine.  Moderately reduced lumbar flexion finger to floor testing.  Palpation: Tenderness to palpation over left PSIS and gluteal tissues and sciatic notch.  Extremities: Full range of motion of the elbows, and wrists with no effusions or tenderness to palpation.   Full range of motion of the hips, knees, and ankles from seated with no effusions or tenderness to palpation.    No hypermobility of the upper or lower extremities.  Neurologic exam: Mental status: Patient is alert and oriented with normal affect.  Attention, knowledge, memory, and language are intact.  Normal coordination throughout the examination.  Reflexes are " 2+ and symmetric biceps, triceps, brachioradialis, 2+ right, trace left patellar, and trace bilateral Achilles with equivocal toes and Negative Penny's.  Sensation is decreased light touch bilateral medial malleolus, intact to light touch throughout the remainder of upper and lower extremities bilaterally.  Manual muscle testing reveals 5 out of 5 in the hip flexors, knee flexors/extensors, ankle plantar flexors, ankle  dorsiflexors, and EHL.  Upper extremities: Grossly normal strength . Normal muscle bulk and tone in the arms and legs.    Positive seated straight leg raise on the left.

## 2022-10-28 ENCOUNTER — TRANSCRIBE ORDERS (OUTPATIENT)
Dept: OTHER | Age: 82
End: 2022-10-28

## 2022-10-28 DIAGNOSIS — M48.061 SPINAL STENOSIS, LUMBAR REGION, WITHOUT NEUROGENIC CLAUDICATION: Primary | ICD-10-CM

## 2022-11-09 ENCOUNTER — RADIOLOGY INJECTION OFFICE VISIT (OUTPATIENT)
Dept: PHYSICAL MEDICINE AND REHAB | Facility: CLINIC | Age: 82
End: 2022-11-09
Attending: PHYSICAL MEDICINE & REHABILITATION
Payer: COMMERCIAL

## 2022-11-09 VITALS
DIASTOLIC BLOOD PRESSURE: 70 MMHG | SYSTOLIC BLOOD PRESSURE: 124 MMHG | TEMPERATURE: 98.1 F | RESPIRATION RATE: 16 BRPM | HEART RATE: 54 BPM | OXYGEN SATURATION: 97 %

## 2022-11-09 DIAGNOSIS — M54.16 LUMBAR RADICULAR PAIN: ICD-10-CM

## 2022-11-09 DIAGNOSIS — M48.061 FORAMINAL STENOSIS OF LUMBAR REGION: ICD-10-CM

## 2022-11-09 PROCEDURE — 64483 NJX AA&/STRD TFRM EPI L/S 1: CPT | Mod: LT | Performed by: PHYSICAL MEDICINE & REHABILITATION

## 2022-11-09 RX ORDER — METHYLPREDNISOLONE ACETATE 80 MG/ML
INJECTION, SUSPENSION INTRA-ARTICULAR; INTRALESIONAL; INTRAMUSCULAR; SOFT TISSUE
Status: COMPLETED | OUTPATIENT
Start: 2022-11-09 | End: 2022-11-09

## 2022-11-09 RX ORDER — LIDOCAINE HYDROCHLORIDE 10 MG/ML
INJECTION, SOLUTION EPIDURAL; INFILTRATION; INTRACAUDAL; PERINEURAL
Status: COMPLETED | OUTPATIENT
Start: 2022-11-09 | End: 2022-11-09

## 2022-11-09 RX ADMIN — LIDOCAINE HYDROCHLORIDE 2 ML: 10 INJECTION, SOLUTION EPIDURAL; INFILTRATION; INTRACAUDAL; PERINEURAL at 09:06

## 2022-11-09 RX ADMIN — METHYLPREDNISOLONE ACETATE 80 MG: 80 INJECTION, SUSPENSION INTRA-ARTICULAR; INTRALESIONAL; INTRAMUSCULAR; SOFT TISSUE at 09:06

## 2022-11-09 ASSESSMENT — PAIN SCALES - GENERAL
PAINLEVEL: MODERATE PAIN (5)
PAINLEVEL: SEVERE PAIN (7)

## 2022-11-09 NOTE — PATIENT INSTRUCTIONS
Follow-up visit with Dr. Wheatley in 2-4 weeks to discuss injection outcome and determine care plan going forward.       DISCHARGE INSTRUCTIONS    During office hours (8:00 a.m.- 4:00 p.m.) questions or concerns may be answered  by calling [facility] Navigation Nurses at  [phone].  Messages received after hours will be returned the following business day.      In the case of an emergency, please dial 911 or seek assistance at the nearest Emergency Room/Urgent Care facility.     All Patients:    You may experience an increase in your symptoms for the first 2 days (It may take anywhere between 2 days- 2 weeks for the steroid to have maximum effect).    You may use ice on the injection site, as frequently as 20 minutes each hour if needed.    You may take your pain medicine.    You may continue taking your regular medication after your injection. If you have had a Medial Branch Block you may resume pain medication once your pain diary is completed.    You may shower. No swimming, tub bath or hot tub for 48 hours.  You may remove your bandaid/bandage as soon as you are home.    You may resume light activities, as tolerated.    Resume your usual diet as tolerated.    It is strongly advised that you do not drive for 1-3 hours post injection.    If you have had oral sedation:  Do not drive for 8 hours post injection.      If you have had IV sedation:  Do not drive for 24 hours post injection.  Do not operate hazardous machinery or make important personal/business decisions for 24 hours.      POSSIBLE STEROID SIDE EFFECTS (If steroid/cortisone was used for your procedure)    -If you experience these symptoms, it should only last for a short period    Swelling of the legs              Skin redness (flushing)     Mouth (oral) irritation   Blood sugar (glucose) levels            Sweats                    Mood changes  Headache  Sleeplessness  Weakened immune system for up to 14 days, which could increase the risk of  rudy the COVID-19 virus and/or experiencing more severe symptoms of the disease, if exposed.  Decreased effectiveness of the flu vaccine if given within 2 weeks of the steroid.         POSSIBLE PROCEDURE SIDE EFFECTS  -Call the Spine Center if you are concerned  Increased Pain           Increased numbness/tingling      Nausea/Vomiting          Bruising/bleeding at site      Redness or swelling                                              Difficulty walking      Weakness           Fever greater than 100.5    *In the event of a severe headache after an epidural steroid injection that is relieved by lying down, please call the [facility] to speak with a clinical staff member*

## 2022-12-02 ENCOUNTER — OFFICE VISIT (OUTPATIENT)
Dept: PHYSICAL MEDICINE AND REHAB | Facility: CLINIC | Age: 82
End: 2022-12-02
Payer: COMMERCIAL

## 2022-12-02 VITALS — DIASTOLIC BLOOD PRESSURE: 67 MMHG | HEART RATE: 53 BPM | SYSTOLIC BLOOD PRESSURE: 122 MMHG

## 2022-12-02 DIAGNOSIS — M47.816 LUMBAR FACET ARTHROPATHY: ICD-10-CM

## 2022-12-02 DIAGNOSIS — M48.061 FORAMINAL STENOSIS OF LUMBAR REGION: ICD-10-CM

## 2022-12-02 DIAGNOSIS — M54.16 LUMBAR RADICULAR PAIN: Primary | ICD-10-CM

## 2022-12-02 DIAGNOSIS — M48.061 STENOSIS OF LATERAL RECESS OF LUMBAR SPINE: ICD-10-CM

## 2022-12-02 PROCEDURE — 99213 OFFICE O/P EST LOW 20 MIN: CPT | Performed by: PHYSICAL MEDICINE & REHABILITATION

## 2022-12-02 ASSESSMENT — PAIN SCALES - GENERAL: PAINLEVEL: MILD PAIN (2)

## 2022-12-02 NOTE — PROGRESS NOTES
Assessment/Plan:      Sudhakar was seen today for back pain.    Diagnoses and all orders for this visit:    Lumbar radicular pain    Foraminal stenosis of lumbar region    Stenosis of lateral recess of lumbar spine    Lumbar facet arthropathy         Assessment: Pleasant 82 year old male with a history of coronary artery disease status post CABG,   atrial fibrillation, prostate cancer, status post 3 lumbar discectomies with:     1.  Chronic lumbar spine pain with a year and a half history of left lower extremity radicular pain which is worsening.  This is in an L4/5 distribution.  He has severe left foraminal stenosis L4-5 with left lateral recess stenosis at L4-5 with degenerative disc disease broad-based disc bulge and facet arthropathy.  There is also disc osteophyte complex at L5-S1 resulting in mild to moderate bilateral foraminal stenosis and left lateral recess stenosis.  Over 80% improved following a left L4-5 TFESI November 9, 2022.  Has not yet started physical therapy but plans to do so in the near future.  Still has some left-sided low back pain at the PSIS which may represent proximal radicular pain versus facet arthropathy.     2.  Severe facet arthropathy bilateral L5-S1 moderate L4-5.       Discussion:    1.  We discussed the diagnosis and treatment options.  We discussed the option of continuing with physical therapy  or further interventions.  At this time he is doing quite well with over 80% relief of his symptoms and is quite pleased.  He would like to just continue with physical therapy at this time.    2.  Continue with physical therapy.    3.  Follow-up with me as needed.         It was our pleasure caring for your patient today, if there any questions or concerns please do not hesitate to contact us.      Subjective:   Patient ID: Sudhakar Colmenares is a 82 year old male.    History of Present Illness: Patient presents for follow-up of lumbar spine pain left lower extremity radicular pain  following lumbar epidural steroid injection left L4-5.  He is over 80% relief of his symptoms.  He is quite pleased with the injection results.  He still has some pain over the left PSIS and lumbar paraspinals worse with prolonged sitting better with laying down.  Feels that he is walking better or functional feels there is some slight weakness in his left leg overall but much better and he is quite pleased.  Pain is an 8/10 at worst 2/10 today and at best.  Has not yet started physical therapy plans to do so in the near future.    Imaging: MRI lumbar spine reviewed from October 1, 2022.  Lumbar degenerative changes similar to prior MRI postsurgical changes L5-S1 severe right moderate left facet arthropathy mild to moderate right foraminal stenosis.  L4-5 moderate left mild right facet arthropathy moderate severe left and no right foraminal stenosis.  Mild stenosis L3-4 centrally.    Review of Systems: Complains of paresthesias weakness left leg and bladder control issues but nothing new.  No bowel incontinence, no headaches, falls, swallowing issues, fevers or weight loss      Prior interventions:  1.  Left L4-5 TFESI.    Past Medical History:   Diagnosis Date     A-fib (H)      Cancer (H)      Heart disease        The following portions of the patient's history were reviewed and updated as appropriate: allergies, current medications, past family history, past medical history, past social history, past surgical history and problem list.           Objective:   Physical Exam:    /67   Pulse 53   There is no height or weight on file to calculate BMI.      General: Alert and oriented with normal affect. Attention, knowledge, memory, and language are intact. No acute distress.    CV: No lower extremity edema.    Gait:  Nonantalgic  Full range of motion of the hips from seated internal and external rotation.  Sensation is intact to light touch throughout the  lower extremities.  Reflexes are   2+ patellar and 0  Achilles      Manual muscle testing reveals:  Right /Left out of 5     5/5 hip flexors  5/5 knee flexors  5/5 knee extensors  5/5 ankle plantar flexors  5/5 ankle dorsiflexors  5/5  L

## 2022-12-06 ENCOUNTER — HOSPITAL ENCOUNTER (OUTPATIENT)
Dept: PHYSICAL THERAPY | Facility: REHABILITATION | Age: 82
Discharge: HOME | End: 2022-12-06
Payer: COMMERCIAL

## 2022-12-06 DIAGNOSIS — M54.16 LUMBAR RADICULAR PAIN: Primary | ICD-10-CM

## 2022-12-06 DIAGNOSIS — M48.061 STENOSIS OF LATERAL RECESS OF LUMBAR SPINE: ICD-10-CM

## 2022-12-06 DIAGNOSIS — M62.81 MUSCLE WEAKNESS (GENERALIZED): ICD-10-CM

## 2022-12-06 DIAGNOSIS — M47.816 LUMBAR FACET ARTHROPATHY: ICD-10-CM

## 2022-12-06 DIAGNOSIS — M48.061 FORAMINAL STENOSIS OF LUMBAR REGION: ICD-10-CM

## 2022-12-06 PROCEDURE — 97110 THERAPEUTIC EXERCISES: CPT | Mod: GP | Performed by: PHYSICAL THERAPIST

## 2022-12-06 PROCEDURE — 97161 PT EVAL LOW COMPLEX 20 MIN: CPT | Mod: GP | Performed by: PHYSICAL THERAPIST

## 2022-12-06 NOTE — PROGRESS NOTES
"Assessment:  Pt presents to skilled PT with low back with intermittent L LE radiculopathy that started 2 years ago but pt has a long term h/o LBP.  The pt has decreased lumbar ROM with some increased pain.  He has significant loss of LE flexibility.  The pt has decreased LE, core and lumbar strength with decreased awareness.  He has increased tightness and tenderness over L>R lumbar muscles.  The pt does has a + slump test on the L.  He will benefit from skilled PT to address the impairments identified during evaluation.     Exercises:  Date 12/6/22   Exercise    Nu-step or Treadmill     Seated H/S stretch  B x30\"   Seated nerve glide  Bx5   LTR Bx5   Supine hip ABD+ ER  Bx10 + 30\" hold on last rep    Ab set  x5 with 5\" hold    Supine Bridge  Bx10                            "

## 2022-12-06 NOTE — PROGRESS NOTES
"[facility] Rehabilitation Initial Evaluation        12/06/22 0700   General Information   Type of Visit Initial OP Ortho PT Evaluation   Start of Care Date 12/06/22   Referring Physician Dr. Wheatley   Patient/Family Goals Statement less pain   Orders Evaluate and Treat   Date of Order 10/26/22   Medical Diagnosis Lumbar radicular pain  Foraminal stenosis of lumbar region  Stenosis of lateral recess of lumbar spine  Lumbar facet arthropathy   Body Part(s)   Body Part(s) Lumbar Spine/SI   Presentation and Etiology   Pertinent history of current problem (include personal factors and/or comorbidities that impact the POC) Pt reports a long history of low back pain.  Over the past 2 years, he has had worsening pain and sx.  He has had 3 back surgeries to remove \"lateral chips\" in his spine - in 1998, 2002 x2.  He has had 3 injection with some success over the past 2 years.  He had another steriod injection at L3/4 about 4 weeks ago and that has helped quite a bit.  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  He now has pain on the L side of his low back/top pf pelvis.  He gets occasional weakness in the L left LE, but his leg feels stronger. He has the most pain with sitting, standing, bending.  He has not done PT for his back but he used to work out quite a bit.  He no longer goes to the gym.  He was ill with Covid from March-June 2020. He is not doing modalities or anything particular for his back at this time.   Onset date of current episode/exacerbation 10/26/22   Pain quality B. Dull;C. Aching;H. Other   Pain quality comment Leg weakness - approx 1x/day   Prior Level of Function   Prior Level of Function-Mobility WFL   Prior Level of Function-ADLs WFL   Fall Risk Screen   Fall screen completed by PT   Have you fallen 2 or more times in the past year? Yes   Have you fallen and had an injury in the past year? No   Is patient a fall risk? No   Fall screen comments 2 falls were due to L leg weakness prior to this " past injection   Abuse Screen (yes response referral indicated)   Feels Unsafe at Home or Work/School no   Feels Threatened by Someone no   Does Anyone Try to Keep You From Having Contact with Others or Doing Things Outside Your Home? no   Physical Signs of Abuse Present no   Patient needs abuse support services and resources No   Lumbar Spine/SI Objective Findings   Gait/Locomotion WNL   Hamstring Flexibility 40 B   Hip Flexor Flexibility dec   Quadricep Flexibility fair+   Flexion ROM mod loss (39cm fingertip to floor), stiffness   Extension ROM min loss, stretching   Right Side Bending ROM min+ loss, tight   Left Side Bending ROM mod loss, pain L low back   Lumbar ROM Comment R rot = min+ loss, no pain; L rot = min loss, no pain   Hip Flexion (L2) Strength 4+ B   Hip Abduction Strength 4+ B   Hip Adduction Strength 4+ B   Hip Extension Strength 4   Knee Flexion Strength 5   Knee Extension (L3) Strength 5   Ankle Dorsiflexion (L4) Strength 5   Ankle Plantar Flexion (S1) Strength 5   Lumbar/Hip/Knee/Foot Strength Comments L hip ER/IR= 4/5   SLR -   Crossover SLR -   Segmental Mobility mod loss of PA mobility; pain over L SI area   Palpation increased tightness and tenderness over the   Slump Test + L   Posture fair   Planned Therapy Interventions   Planned Therapy Interventions joint mobilization;manual therapy;neuromuscular re-education;ROM;strengthening;stretching   Planned Modality Interventions   Planned Modality Interventions Cryotherapy;Electrical stimulation;Hot packs;TENS   Clinical Impression   Criteria for Skilled Therapeutic Interventions Met yes, treatment indicated   PT Diagnosis LBP with L LE radiculopathy   Influenced by the following impairments decreased ROM and mobility, weakness, tenderness   Functional limitations due to impairments ADL's, sitting, standing, banding   Clinical Presentation Stable/Uncomplicated   Clinical Decision Making (Complexity) Low complexity   Therapy Frequency 1 time/week    Predicted Duration of Therapy Intervention (days/wks) 1x/week for 90 days   Risk & Benefits of therapy have been explained Yes   Patient, Family & other staff in agreement with plan of care Yes   Clinical Impression Comments Assessment:  Pt presents to skilled PT with low back with intermittent L LE radiculopathy that started 2 years ago but pt has a long term h/o LBP.  The pt has decreased lumbar ROM with some increased pain.  He has significant loss of LE flexibility.  The pt has decreased LE, core and lumbar strength with decreased awareness.  He has increased tightness and tenderness over L>R lumbar muscles.  The pt does has a + slump test on the L.  He will benefit from skilled PT to address the impairments identified during evaluation.   Ortho Goal 1   Goal Identifier 1   Goal Description Pt will demonstarte readiness for independent sx management and HEP   Target Date 01/20/23   Ortho Goal 2   Goal Identifier 2   Goal Description Pt will be able to bend over with increased ease and LBP <=4/10   Target Date 01/20/23   Ortho Goal 3   Goal Identifier 2   Goal Description Pt will be able to sit with increased comfort and tolerance of meals, driving, etc with pain <=4/10   Target Date 01/20/23   Ortho Goal 4   Goal Identifier 4   Goal Description Pt will decrease his FROYLAN score to <=15% to demonstrated decreased pain and increased function   Target Date 01/20/23   Total Evaluation Time   PT Eval, Low Complexity Minutes (10816) 20     Amarilis Sainz, PT

## 2022-12-14 ENCOUNTER — HOSPITAL ENCOUNTER (OUTPATIENT)
Dept: PHYSICAL THERAPY | Facility: REHABILITATION | Age: 82
Discharge: HOME | End: 2022-12-14
Payer: COMMERCIAL

## 2022-12-14 DIAGNOSIS — M62.81 MUSCLE WEAKNESS (GENERALIZED): ICD-10-CM

## 2022-12-14 DIAGNOSIS — M48.061 STENOSIS OF LATERAL RECESS OF LUMBAR SPINE: ICD-10-CM

## 2022-12-14 DIAGNOSIS — M54.16 LUMBAR RADICULAR PAIN: Primary | ICD-10-CM

## 2022-12-14 DIAGNOSIS — M48.061 FORAMINAL STENOSIS OF LUMBAR REGION: ICD-10-CM

## 2022-12-14 DIAGNOSIS — M47.816 LUMBAR FACET ARTHROPATHY: ICD-10-CM

## 2022-12-14 PROCEDURE — 97140 MANUAL THERAPY 1/> REGIONS: CPT | Mod: GP | Performed by: PHYSICAL THERAPIST

## 2022-12-14 PROCEDURE — 97110 THERAPEUTIC EXERCISES: CPT | Mod: GP | Performed by: PHYSICAL THERAPIST

## 2022-12-14 NOTE — PROGRESS NOTES
"Outpatient Physical Therapy Daily Progress Note    Patient: Sudhakar Colmenares  : 1940  Start of Care: 24  Date of Visit: 2022  Visit: 2    Referring Provider: Dr. Wheatley     Therapy Diagnosis: LBP with L LE radiculopathy     Client Self Report:   Pt reports that he did his exercises every other day.  He was sore in his hips from the exercises.  He shoveled already this AM.      Objective Measurements:    Decreased core awareness    Increased tightness and tenderness   L lumbar paraspinals = moderate       Assessment:  Pt returns to skilled PT with low back with intermittent L LE radiculopathy that started 2 years ago but pt has a long term h/o LBP.  The pt has decreased lumbar ROM with some increased pain.  He has significant loss of LE flexibility.  The pt has decreased LE, core and lumbar strength with decreased awareness.  He has increased tightness and tenderness over L>R lumbar muscles.  The pt does has a + slump test on the L.  Since starting PT, his sx have not changed much but he is sore from the exercises.  PT did initiate a small amount of MT to the L lumbar muscles.  He will continue to benefit from skilled PT to address the impairments identified during evaluation.    Goals:  See daily doc     Plan:  Continue therapy per current plan of care.      Today's Treatment:       Exercises:  Date 22   Exercise     Nu-step or Treadmill  x5 min RL:5     Seated H/S stretch  B 2x30\"  B x30\"   Seated nerve glide  Changed to supine to avoid neck pain  Bx10  Bx5   LTR Bx10  Bx5   Supine hip ABD+ ER   Bx10 + 30\" hold on last rep    Ab set   x5 with 5\" hold    Supine Bridge   Bx10                                 "

## 2023-01-10 ENCOUNTER — HOSPITAL ENCOUNTER (OUTPATIENT)
Dept: PHYSICAL THERAPY | Facility: REHABILITATION | Age: 83
Discharge: HOME | End: 2023-01-10
Payer: COMMERCIAL

## 2023-01-10 DIAGNOSIS — M62.81 MUSCLE WEAKNESS (GENERALIZED): ICD-10-CM

## 2023-01-10 DIAGNOSIS — M54.16 LUMBAR RADICULAR PAIN: Primary | ICD-10-CM

## 2023-01-10 DIAGNOSIS — M48.061 STENOSIS OF LATERAL RECESS OF LUMBAR SPINE: ICD-10-CM

## 2023-01-10 DIAGNOSIS — M48.061 FORAMINAL STENOSIS OF LUMBAR REGION: ICD-10-CM

## 2023-01-10 DIAGNOSIS — M47.816 LUMBAR FACET ARTHROPATHY: ICD-10-CM

## 2023-01-10 PROCEDURE — 97140 MANUAL THERAPY 1/> REGIONS: CPT | Mod: GP | Performed by: PHYSICAL THERAPIST

## 2023-01-10 PROCEDURE — 97110 THERAPEUTIC EXERCISES: CPT | Mod: GP | Performed by: PHYSICAL THERAPIST

## 2023-01-10 NOTE — PROGRESS NOTES
"Outpatient Physical Therapy Daily Progress Note    Patient: Sudhakar Colmenares  : 1940  Start of Care: 24  Date of Visit: 2022  Visit: 2    Referring Provider: Dr. Wheatley     Therapy Diagnosis: LBP with L LE radiculopathy     Client Self Report:   Pt reports that his back did not feel very good because he did so much snow shoveling.  He did do his stretches and exercises after shoveling and that did help.   He does have less sharp pain in the L hip.  He does still feel like his L leg is weak and like it could give out sometimes.  He has been doing his HEP.  He did feel some relief from the MT last time.     Objective Measurements:    Decreased core awareness    Increased tightness and tenderness   L lumbar paraspinals = moderate       Assessment:  Pt continued in skilled PT to address his low back with intermittent L LE radiculopathy that started 2 years ago but pt has a long term h/o LBP.  The pt has decreased lumbar ROM with some increased pain.  He has significant loss of LE flexibility.  The pt has decreased LE, core and lumbar strength with decreased awareness.  He has increased tightness and tenderness over L>R lumbar muscles.  The pt does has a + slump test on the L.  Since starting PT, his sx have not changed much but he has been missing some of the key exercises in his HEP.  PT and pt reviewed his HEP and corrected exercise technique and reps.  PT did continue a small amount of MT to the L lumbar muscles.  He will continue to benefit from skilled PT to address the impairments identified during evaluation.    Goals:  See daily doc     Plan:  Continue therapy per current plan of care.      Today's Treatment:       Exercises:  Date 1/10/23 12/14/22 12/6/22   Exercise      Nu-step or Treadmill  x5 min RL:5  x5 min RL:5     Seated H/S stretch  Seated Bx30\" B 2x30\"  B x30\"   Seated nerve glide  Supine manual H/S stretch Bx60\"   N. dora R, L x10 Changed to supine to avoid neck pain  Bx10  Bx5 " "  LTR Bx10 Bx10  Bx5   Supine hip ABD+ ER    Bx10 + 30\" hold on last rep    Ab set  Ab set + march B 2x5 - major cues   x5 with 5\" hold    Supine Bridge  Bx12 - cues for reps  Bx10                                      "

## 2023-01-19 ENCOUNTER — HOSPITAL ENCOUNTER (OUTPATIENT)
Dept: PHYSICAL THERAPY | Facility: REHABILITATION | Age: 83
Discharge: HOME | End: 2023-01-19
Payer: COMMERCIAL

## 2023-01-19 DIAGNOSIS — M54.16 LUMBAR RADICULAR PAIN: Primary | ICD-10-CM

## 2023-01-19 DIAGNOSIS — M62.81 MUSCLE WEAKNESS (GENERALIZED): ICD-10-CM

## 2023-01-19 DIAGNOSIS — M47.816 LUMBAR FACET ARTHROPATHY: ICD-10-CM

## 2023-01-19 DIAGNOSIS — M48.061 FORAMINAL STENOSIS OF LUMBAR REGION: ICD-10-CM

## 2023-01-19 DIAGNOSIS — M48.061 STENOSIS OF LATERAL RECESS OF LUMBAR SPINE: ICD-10-CM

## 2023-01-19 PROCEDURE — 97140 MANUAL THERAPY 1/> REGIONS: CPT | Mod: GP | Performed by: PHYSICAL THERAPIST

## 2023-01-19 PROCEDURE — 97110 THERAPEUTIC EXERCISES: CPT | Mod: GP | Performed by: PHYSICAL THERAPIST

## 2023-01-19 NOTE — PROGRESS NOTES
Outpatient Physical Therapy Daily Progress Note    Patient: Sudhakar Colmenares  : 1940  Start of Care: 22  Date of Visit: 2023  Visit: 4    Referring Provider: Dr. Wheatley     Therapy Diagnosis: LBP with L LE radiculopathy     Client Self Report: .   Pt reports that he has had a set back over the past few days.  A little better today, but he has been already out moving the snow.    Today, he is feeling stiffness in his low back and between his shoulder blades.  He has some minor soreness in his hip and then pain into his L knee while on the nu-step today.  He is having some more frequent neck pain as well, but he has felt increased stressed.  He has been doing his HEP daily and they do help him feel some better.     Objective Measurements:    Decreased core awareness    Increased tightness and tenderness   L lumbar paraspinals = moderate   L gluteals = moderate       Assessment:  Pt continues in skilled PT to address his low back with intermittent L LE radiculopathy that started 2 years ago but pt has a long term h/o LBP.  The pt has decreased lumbar ROM with some increased pain.  He has continued loss of LE flexibility.  The pt has decreased LE, core and lumbar strength with decreased awareness.  He has increased tightness and tenderness over L>R lumbar muscles.  The pt does has a + slump test on the L.  Since starting PT, his sx have been up and down depending on activity level, etc.  PT and pt reviewed his HEP and corrected exercise technique and reps.  PT did continue a small amount of MT to the L lumbar muscles.  He will continue to benefit from skilled PT to address the impairments identified during evaluation.    Goals:  See daily doc - updated 23    Plan:  Continue therapy per current plan of care.      Today's Treatment:       Exercises:  Date 1/19/23 1/10/23 12/14/22 12/6/22   Exercise       Nu-step or Treadmill  x4 min RL:5   Stopped d/t knee p x5 min RL:5  x5 min RL:5     Seated H/S  "stretch   Seated Bx30\" B 2x30\"  B x30\"   Seated nerve glide  Supine manual H/S stretch B 2x30\"  N glide R, L x20  Supine hip flexor/quad stretch off table R, L x60\" Supine manual H/S stretch Bx60\"   N. glifge R, L x10 Changed to supine to avoid neck pain  Bx10  Bx5   LTR  Bx10 Bx10  Bx5   Supine hip ABD+ ER  Bx10 + 30\" hold    Bx10 + 30\" hold on last rep    Ab set  Standing ab set x5 with 10\" hold  Supine ab set+ march Bx10 - cues for speed  Ab set + march B 2x5 - major cues   x5 with 5\" hold    Supine Bridge  Reviewed for reps Bx12 - cues for reps  Bx10    Standing hip ABD  Bx20 alt                                       "

## 2023-01-26 ENCOUNTER — HOSPITAL ENCOUNTER (OUTPATIENT)
Dept: PHYSICAL THERAPY | Facility: REHABILITATION | Age: 83
Discharge: HOME | End: 2023-01-26
Payer: COMMERCIAL

## 2023-01-26 DIAGNOSIS — M54.16 LUMBAR RADICULAR PAIN: Primary | ICD-10-CM

## 2023-01-26 DIAGNOSIS — M62.81 MUSCLE WEAKNESS (GENERALIZED): ICD-10-CM

## 2023-01-26 DIAGNOSIS — M47.816 LUMBAR FACET ARTHROPATHY: ICD-10-CM

## 2023-01-26 DIAGNOSIS — M48.061 FORAMINAL STENOSIS OF LUMBAR REGION: ICD-10-CM

## 2023-01-26 DIAGNOSIS — M48.061 STENOSIS OF LATERAL RECESS OF LUMBAR SPINE: ICD-10-CM

## 2023-01-26 PROCEDURE — 97140 MANUAL THERAPY 1/> REGIONS: CPT | Mod: GP | Performed by: PHYSICAL THERAPIST

## 2023-01-26 PROCEDURE — 97110 THERAPEUTIC EXERCISES: CPT | Mod: GP | Performed by: PHYSICAL THERAPIST

## 2023-01-26 NOTE — PROGRESS NOTES
Outpatient Physical Therapy Daily Progress Note    Patient: Sudhakar Colmenares  : 1940  Start of Care: 22  Date of Visit: 2023  Visit: 5    Referring Provider: Dr. Wheatley     Therapy Diagnosis: LBP with L LE radiculopathy     Client Self Report: .   Pt reports that he has been having pain in his L shoulder blade over the past few weeks.  The pain is getting worse not better and really limits his ability to sleep.  At this point, the shoulder blade pain is worse than than the back.  He has had some L LE weakness.  No pain in the low back.  He has been mostly consistent with his HEP.       Objective Measurements:    Decreased core awareness    Increased tightness and tenderness   L lumbar paraspinals = moderate   L gluteals = moderate       Assessment:  Pt continues in skilled PT to address his low back with intermittent L LE radiculopathy that started 2 years ago but pt has a long term h/o LBP.  The pt  Now has increasing lumbar ROM with decreasing pain.  He has continued loss of LE flexibility.  The pt has decreased LE, core and lumbar strength with decreased awareness.  He has continued tightness and tenderness over L>R lumbar muscles as well as some new additional tension in his L scapular and thoracic area.  The pt does has a + slump test on the L.  Since starting PT, the lumbar radiculopathy sx having been improving.  PT and pt reviewed his HEP and corrected exercise technique and reps.  PT did continue a small amount of MT to the L lumbar, thoracic and scapular muscles.  He will continue to benefit from skilled PT to address the impairments identified during evaluation.    Goals:  See daily doc - updated 23    Plan:  Continue therapy per current plan of care.      Today's Treatment:       Exercises:  Date 1/26/23 1/19/23 1/10/23 12/14/22 12/6/22   Exercise        Nu-step or Treadmill  x5 min RL:5  x4 min RL:5   Stopped d/t knee p x5 min RL:5  x5 min RL:5     Seated H/S stretch  Supine H/S  "stretch Bx30\"  Seated Bx30\" B 2x30\"  B x30\"   Seated nerve glide  Supine nerve glide R, L x10     Supine manual hip flexor/quad stretch Bx30\"  Supine manual H/S stretch B 2x30\"  N glide R, L x20  Supine hip flexor/quad stretch off table R, L x60\" Supine manual H/S stretch Bx60\"   N. glifge R, L x10 Changed to supine to avoid neck pain  Bx10  Bx5   LTR   Bx10 Bx10  Bx5   Supine hip ABD+ ER   Bx10 + 30\" hold    Bx10 + 30\" hold on last rep    Ab set  Standing ab set + march 2x10 Standing ab set x5 with 10\" hold  Supine ab set+ march Bx10 - cues for speed  Ab set + march B 2x5 - major cues   x5 with 5\" hold    Supine Bridge  Bx10 with 10\" holds  Reviewed for reps Bx12 - cues for reps  Bx10    Standing hip ABD  B 2x10  Bx20 alt       Mini Squats with ab set 2x10                                   "

## 2023-02-02 ENCOUNTER — HOSPITAL ENCOUNTER (OUTPATIENT)
Dept: PHYSICAL THERAPY | Facility: REHABILITATION | Age: 83
Discharge: HOME | End: 2023-02-02
Payer: COMMERCIAL

## 2023-02-02 DIAGNOSIS — M54.16 LUMBAR RADICULAR PAIN: Primary | ICD-10-CM

## 2023-02-02 DIAGNOSIS — M48.061 STENOSIS OF LATERAL RECESS OF LUMBAR SPINE: ICD-10-CM

## 2023-02-02 DIAGNOSIS — M47.816 LUMBAR FACET ARTHROPATHY: ICD-10-CM

## 2023-02-02 DIAGNOSIS — M48.061 FORAMINAL STENOSIS OF LUMBAR REGION: ICD-10-CM

## 2023-02-02 DIAGNOSIS — M62.81 MUSCLE WEAKNESS (GENERALIZED): ICD-10-CM

## 2023-02-02 PROCEDURE — 97110 THERAPEUTIC EXERCISES: CPT | Mod: GP | Performed by: PHYSICAL THERAPIST

## 2023-02-02 PROCEDURE — 97140 MANUAL THERAPY 1/> REGIONS: CPT | Mod: GP | Performed by: PHYSICAL THERAPIST

## 2023-02-02 NOTE — PROGRESS NOTES
Outpatient Physical Therapy Daily Progress Note    Patient: Sudhakar Colmenares  : 1940  Start of Care: 22  Date of Visit: 2023  Visit: 6    Referring Provider: Dr. Wheatley     Therapy Diagnosis: LBP with L LE radiculopathy     Client Self Report: .   Pt reports that he is sore all over this week.  He notes that he did a bunch of shoveling this week to  after his dog.  He also suspects the cold does not help his pain.  Today, he reports increased pain in his left low back and hip and down to the knee.   He was compliant with his HEP.   He does feel benefit from MT in each session.     Objective Measurements:    Decreased core awareness    Increased tightness and tenderness   L lumbar paraspinals = moderate   L gluteals = moderate       Assessment:  Pt continues in skilled PT to address his low back with intermittent L LE radiculopathy that started 2 years ago but pt has a long term h/o LBP.  Over the past week, the pt has had increased soreness all over but particularly in his L low back, glute and LE.  This could be activity related.  The pt was demonstrating  increasing lumbar ROM with decreasing pain, but is slightly more restricted today.  He has continued loss of LE flexibility.  The pt has continued LE, core and lumbar weakness, but with improving awareness.  He has continued tightness and tenderness over L>R lumbar muscles as well as some new additional tension in his L scapular and thoracic area.  The pt does has a + slump test on the L.  Since starting PT, the lumbar radiculopathy sx having been improving with a minor set back this week.  PT and pt reviewed his HEP with additional focus on stretching and mobility today.  PT did continue MT to the L lumbar, glute and LE muscles with great reduction in pain. He will continue to benefit from skilled PT to address the impairments identified during evaluation.    Goals:  See daily doc - updated 23    Plan:  Continue therapy per current  "plan of care.      Today's Treatment:       Exercises:  Date 2/2/23 1/26/23 1/19/23 1/10/23 12/14/22 12/6/22   Exercise         Nu-step or Treadmill  x5 min RL:5  x5 min RL:5  x4 min RL:5   Stopped d/t knee p x5 min RL:5  x5 min RL:5     Seated H/S stretch  B 2x30\" on step  Supine H/S stretch Bx30\"  Seated Bx30\" B 2x30\"  B x30\"   Seated nerve glide  Standing - attempts x30\" B Supine nerve glide R, L x10     Supine manual hip flexor/quad stretch Bx30\"  Supine manual H/S stretch B 2x30\"  N glide R, L x20  Supine hip flexor/quad stretch off table R, L x60\" Supine manual H/S stretch Bx60\"   N. glifge R, L x10 Changed to supine to avoid neck pain  Bx10  Bx5   LTR Bx10   Bx10 Bx10  Bx5   Supine hip ABD+ ER  Bx10+30\"  Bx10 + 30\" hold    Bx10 + 30\" hold on last rep    Ab set  Ab set + march Bx10 Standing ab set + march 2x10 Standing ab set x5 with 10\" hold  Supine ab set+ march Bx10 - cues for speed  Ab set + march B 2x5 - major cues   x5 with 5\" hold    Supine Bridge  Bx10 with 10\" hold  Bx10 with 10\" holds  Reviewed for reps Bx12 - cues for reps  Bx10    Standing hip ABD   B 2x10  Bx20 alt       Mini Squats with ab set  2x10                 Cat/cow x10 + child's pose x30\" after MT                     "

## 2023-02-09 ENCOUNTER — HOSPITAL ENCOUNTER (OUTPATIENT)
Dept: PHYSICAL THERAPY | Facility: REHABILITATION | Age: 83
Discharge: HOME | End: 2023-02-09
Payer: COMMERCIAL

## 2023-02-09 DIAGNOSIS — M48.061 FORAMINAL STENOSIS OF LUMBAR REGION: ICD-10-CM

## 2023-02-09 DIAGNOSIS — M54.16 LUMBAR RADICULAR PAIN: Primary | ICD-10-CM

## 2023-02-09 DIAGNOSIS — M48.061 STENOSIS OF LATERAL RECESS OF LUMBAR SPINE: ICD-10-CM

## 2023-02-09 DIAGNOSIS — M47.816 LUMBAR FACET ARTHROPATHY: ICD-10-CM

## 2023-02-09 DIAGNOSIS — M62.81 MUSCLE WEAKNESS (GENERALIZED): ICD-10-CM

## 2023-02-09 PROCEDURE — 97110 THERAPEUTIC EXERCISES: CPT | Mod: GP | Performed by: PHYSICAL THERAPIST

## 2023-02-09 PROCEDURE — 97140 MANUAL THERAPY 1/> REGIONS: CPT | Mod: GP | Performed by: PHYSICAL THERAPIST

## 2023-02-09 NOTE — PROGRESS NOTES
Outpatient Physical Therapy Daily Progress Note    Patient: Sudhakar Colmenares  : 1940  Start of Care: 24  Date of Visit: 2023  Visit: 7    Referring Provider: Dr. Wheatley     Therapy Diagnosis: LBP with L LE radiculopathy     Client Self Report: .   Pt reports that his low back and L hip sx are doing very well.  He has had hardly any pain in this area for the past week.  He continues to have a burning pain at his shoulder blades.  He will be seeing his provider next week and will discuss this area.   He has not used any heat or ice in this area. He is doing his HEP.       Objective Measurements:    Increasing core and posture awareness    Continued tightness and tenderness   L lumbar paraspinals = moderate   L gluteals = moderate       Assessment:  Pt continues in skilled PT to address his low back with intermittent L LE radiculopathy that started 2 years ago but pt has a long term h/o LBP.   Over the past week, the pt has felt much better with none to minimal pain in his low back and L glute/hip.  However, he continues to have his 'new' pain in his shoulder blades.   He now has increasing lumbar ROM with decreasing pain.  He does have continued loss of LE flexibility.  The pt has been working to increase his LE, core and lumbar strength as well as  improving awareness.  He has continued tightness and tenderness over L>R lumbar muscles as well as some new additional tension in his L scapular and thoracic area.  The pt has less has a + slump test on the L.  PT and pt reviewed his HEP with continued focus on the core, mobility and posture.  PT did continue MT to the L lumbar, glute and LE muscles with great reduction in pain. He will continue to benefit from skilled PT to address the impairments identified during evaluation.    Goals:  See daily doc - updated 23    Plan:  Continue therapy per current plan of care.      Today's Treatment:       Exercises:  Date 2/9/23 2/2/23 1/26/23 1/19/23 1/10/23  "12/14/22 12/6/22   Exercise          Nu-step or Treadmill  x6 min RL;5 x5 min RL:5  x5 min RL:5  x4 min RL:5   Stopped d/t knee p x5 min RL:5  x5 min RL:5     Seated H/S stretch  On bloster -seated  B 2x30\"\" B 2x30\" on step  Supine H/S stretch Bx30\"  Seated Bx30\" B 2x30\"  B x30\"   Seated nerve glide  Bx10 on bolster - PT assisted  Standing - attempts x30\" B Supine nerve glide R, L x10     Supine manual hip flexor/quad stretch Bx30\"  Supine manual H/S stretch B 2x30\"  N glide R, L x20  Supine hip flexor/quad stretch off table R, L x60\" Supine manual H/S stretch Bx60\"   N. glifge R, L x10 Changed to supine to avoid neck pain  Bx10  Bx5   LTR  Bx10   Bx10 Bx10  Bx5   Supine hip ABD+ ER   Bx10+30\"  Bx10 + 30\" hold    Bx10 + 30\" hold on last rep    Ab set  Standing B 2x10  Ab set + march Bx10 Standing ab set + march 2x10 Standing ab set x5 with 10\" hold  Supine ab set+ march Bx10 - cues for speed  Ab set + march B 2x5 - major cues   x5 with 5\" hold    Supine Bridge   Bx10 with 10\" hold  Bx10 with 10\" holds  Reviewed for reps Bx12 - cues for reps  Bx10    Standing hip ABD  B 2x10  B 2x10  Bx20 alt       Mini Squats with ab set Bx10   2x10       Scapular retraction  x10 with 5\" hold           [place] and arrow R, L after MT  Cat/cow x10 + child's pose x30\" after MT                    Amarilis Sianz, PT     "

## 2023-02-15 ENCOUNTER — HOSPITAL ENCOUNTER (OUTPATIENT)
Dept: PHYSICAL THERAPY | Facility: REHABILITATION | Age: 83
Discharge: HOME | End: 2023-02-15
Payer: COMMERCIAL

## 2023-02-15 DIAGNOSIS — M54.16 LUMBAR RADICULAR PAIN: Primary | ICD-10-CM

## 2023-02-15 DIAGNOSIS — M47.816 LUMBAR FACET ARTHROPATHY: ICD-10-CM

## 2023-02-15 DIAGNOSIS — M48.061 FORAMINAL STENOSIS OF LUMBAR REGION: ICD-10-CM

## 2023-02-15 DIAGNOSIS — M48.061 STENOSIS OF LATERAL RECESS OF LUMBAR SPINE: ICD-10-CM

## 2023-02-15 DIAGNOSIS — M62.81 MUSCLE WEAKNESS (GENERALIZED): ICD-10-CM

## 2023-02-15 PROCEDURE — 97140 MANUAL THERAPY 1/> REGIONS: CPT | Mod: GP | Performed by: PHYSICAL THERAPIST

## 2023-02-15 PROCEDURE — 97110 THERAPEUTIC EXERCISES: CPT | Mod: GP | Performed by: PHYSICAL THERAPIST

## 2023-02-15 NOTE — PROGRESS NOTES
Outpatient Physical Therapy Daily Progress Note    Patient: Sudhakar Colmenares  : 1940  Start of Care: 22  Date of Visit: 2/15/2023  Visit: 8    Referring Provider: Dr. Wheatley     Therapy Diagnosis: LBP with L LE radiculopathy     Client Self Report: .   Pt reports that his low back and L hip pain have been improving with HEP. He has had hardly any pain in this area for the past week. He continues to have a burning pain at his shoulder blades. He will be seeing his provider this week and will discuss this area.  He has not used any heat or ice in this area.     Objective Measurements:    Increasing core and posture awareness    Continued tightness and tenderness   L lumbar paraspinals = min-moderate   L gluteals = min-moderate       Assessment:  Pt continues in skilled PT to address his low back with intermittent L LE radiculopathy that started 2 years ago but pt has a long term h/o LBP.   Over the past week, the pt has felt much better with none to minimal pain in his low back and L glute/hip.  However, he continues to have his 'new' pain in his shoulder blades.   He now has increasing lumbar ROM with decreasing pain.  He does have continued loss of LE flexibility.  The pt has been working to increase his LE, core and lumbar strength as well as  improving awareness.  He has continued tightness and tenderness over L>R lumbar muscles as well as some new additional tension in his L scapular and thoracic area.  The pt has less has a + slump test on the L.  PT and pt reviewed his HEP with continued focus on the core, mobility and posture.  PT did continue MT to the L lumbar, glute and LE muscles with great reduction in pain. The patient is nearing readiness to try independent symptom management and HEP.  After  appointment, the PT will hold the chart for 30 days and then discharge the patient if they do not return.      Goals:  See daily doc - updated 23    Plan:  The patient is ready to try  "independent symptom management and HEP.  The PT will hold the chart for 30 days and then discharge the patient if they do not return.      Today's Treatment:       Exercises:  Date 2/15/23 2/9/23 2/2/23 1/26/23 1/19/23 1/10/23 12/14/22 12/6/22   Exercise           Nu-step or Treadmill  x6 min RL: 5 x6 min RL;5 x5 min RL:5  x5 min RL:5  x4 min RL:5   Stopped d/t knee p x5 min RL:5  x5 min RL:5     Seated H/S stretch  On bolster - seated B 2 x 60\" On bloster -seated  B 2x30\"\" B 2x30\" on step  Supine H/S stretch Bx30\"  Seated Bx30\" B 2x30\"  B x30\"   Seated nerve glide   Bx10 on bolster - PT assisted  Standing - attempts x30\" B Supine nerve glide R, L x10     Supine manual hip flexor/quad stretch Bx30\"  Supine manual H/S stretch B 2x30\"  N glide R, L x20  Supine hip flexor/quad stretch off table R, L x60\" Supine manual H/S stretch Bx60\"   N. glifge R, L x10 Changed to supine to avoid neck pain  Bx10  Bx5   LTR Bx10  Bx10   Bx10 Bx10  Bx5   Supine hip ABD+ ER  Bx10 + 30\"  Bx10+30\"  Bx10 + 30\" hold    Bx10 + 30\" hold on last rep    Ab set  Ab set + march B x 10 - good Standing B 2x10  Ab set + march Bx10 Standing ab set + march 2x10 Standing ab set x5 with 10\" hold  Supine ab set+ march Bx10 - cues for speed  Ab set + march B 2x5 - major cues   x5 with 5\" hold    Supine Bridge  Progressed to standing hip extension: Bx10 with initial verbal instruction/ demonstration - pain in low back  Bx10 with 10\" hold  Bx10 with 10\" holds  Reviewed for reps Bx12 - cues for reps  Bx10    Standing hip ABD  Bx10 B 2x10  B 2x10  Bx20 alt       Mini Squats with ab set Bx10 Bx10   2x10       Scapular retraction   x10 with 5\" hold            [place] and arrow R, L after MT  Cat/cow x10 + child's pose x30\" after MT                     Amarilis Sainz, PT   Tonie Krishnan, SPT  I attest that I was present in the room, making skilled assessments and directing the service provided today.  I was responsible for the assessment and treatment of the " patient.  During this time, I was not engaged in treating another patient or doing other tasks.

## 2023-03-06 ENCOUNTER — HOSPITAL ENCOUNTER (OUTPATIENT)
Dept: PHYSICAL THERAPY | Facility: REHABILITATION | Age: 83
Discharge: HOME | End: 2023-03-06
Payer: COMMERCIAL

## 2023-03-06 DIAGNOSIS — M48.061 FORAMINAL STENOSIS OF LUMBAR REGION: ICD-10-CM

## 2023-03-06 DIAGNOSIS — M48.061 STENOSIS OF LATERAL RECESS OF LUMBAR SPINE: ICD-10-CM

## 2023-03-06 DIAGNOSIS — M62.81 MUSCLE WEAKNESS (GENERALIZED): ICD-10-CM

## 2023-03-06 DIAGNOSIS — M54.16 LUMBAR RADICULAR PAIN: Primary | ICD-10-CM

## 2023-03-06 PROCEDURE — 97110 THERAPEUTIC EXERCISES: CPT | Mod: GP | Performed by: PHYSICAL THERAPIST

## 2023-03-06 PROCEDURE — 97140 MANUAL THERAPY 1/> REGIONS: CPT | Mod: GP | Performed by: PHYSICAL THERAPIST

## 2023-03-06 NOTE — PROGRESS NOTES
Outpatient Physical Therapy Discharge Note     Patient: Sudhakar Colmenares  : 1940    Beginning/End Dates of Reporting Period:  23 to 3/6/23    Referring Provider: Dr. lopez     Therapy Diagnosis: LBP with L LE radiculopathy      Client Self Report: Pt reports that his low back and L hip pain has felt pretty good until yesterday and today.  This could likely be related to the amount of bending he did yesterday to clean up after his dog.  He still gets periodic neurological sx.  [facility] and they will be doing an EMG.  He has been doing his HEP daily.   He does feel ready to trial independent sx management and HEP.    Objective Measurements:  See note below for most recent objective information.     Goals: MET   Goal Identifier 1   Goal Description Pt will demonstarte readiness for independent sx management and HEP   Target Date 23   Date Met  02/15/23   Progress (detail required for progress note): Met     Goal Identifier 2   Goal Description Pt will be able to bend over with increased ease and LBP <=4/10   Target Date 23   Date Met  02/15/23   Progress (detail required for progress note): Met     Goal Identifier 2   Goal Description Pt will be able to sit with increased comfort and tolerance of meals, driving, etc with pain <=4/10   Target Date 23   Date Met  02/15/23   Progress (detail required for progress note): Met     Goal Identifier 4   Goal Description Pt will decrease his FROYLAN score to <=15% to demonstrated decreased pain and increased function   Target Date 23   Date Met      Progress (detail required for progress note): Progressing toward     Plan:  Discharge from therapy.    Reason for Discharge: Patient has met all goals.    Discharge Plan: Patient to continue home program.      Amarilis Sainz, PT       Outpatient Physical Therapy Daily Progress Note    Patient: Sudhakar Colmenares  : 1940  Start of Care: 22  Date of  Visit: 3/6/2023  Visit: 9    Referring Provider: Dr. Wheatley     Therapy Diagnosis: LBP with L LE radiculopathy     Client Self Report: .   Pt reports that his low back and L hip pain has felt pretty good until yesterday and today.  This could likely be related to the amount of bending he did yesterday to clean up after his dog.  He still gets periodic neurological sx.  [facility] and they will be doing an EMG.  He has been doing his HEP daily.   He does feel ready to trial independent sx management and HEP.     Objective Measurements:    Lumbar ROM  3/6/23 12/6/23   Flexion ROM Min loss, lumbar stiffness  mod loss (39cm fingertip to floor), stiffness   Extension ROM Min loss, lumbar stiffness  min loss, stretching   Right Side Bending ROM Min loss, lumbar stiffness  min+ loss, tight   Left Side Bending ROM Min loss, lumbar stiffness mod loss, pain L low back   Lumbar ROM Comment R and L Rot = min loss, lumbar stiffness  R rot = min+ loss, no pain; L rot = min loss, no pain     LE strength:   MMT of LE = 5/5 on all     Posture:   Increasing core and posture awareness    Palpation:  Continued tightness and tenderness   L lumbar paraspinals = min-moderate   L gluteals = min-moderate       Assessment:  Pt returns to skilled PT to for his final PT visit to address his low back with intermittent L LE radiculopathy that started 2 years ago but pt has a long term h/o LBP.  Since starting PT, he now has none to minimal pain in his low back and L glute/hip most days.  He now has increasing lumbar ROM with decreasing pain.  He does have continued loss of LE flexibility, but this is addressed in his HEP.  The pt has been working to increase his LE, core and lumbar strength as well as  improving awareness. He will continue to progress this with his HEP independently.  He has continued tightness and tenderness over L>R lumbar muscles. .  The pt has a less + slump test on the L.  PT and pt  "reviewed his HEP with continued focus on the core, mobility and posture.  PT did continue MT to the L lumbar, glute and LE muscles with great reduction in pain. The patient is nearing readiness to try independent symptom management and HEP.  The pt is now ready to trial independent sx management and HEP.      Goals:  See daily doc - Mostly Met     Plan:  The patient is ready to try independent symptom management and HEP.  The PT will hold the chart for 30 days and then discharge the patient if they do not return.      Today's Treatment:       Exercises:  Date 3/6/23 2/15/23 2/9/23 2/2/23 1/26/23 1/19/23 1/10/23 12/14/22 12/6/22   Exercise            Nu-step or Treadmill  Occupied  x6 min RL: 5 x6 min RL;5 x5 min RL:5  x5 min RL:5  x4 min RL:5   Stopped d/t knee p x5 min RL:5  x5 min RL:5     Seated H/S stretch  Manual + hip flexor stretch Bx30\" each On bolster - seated B 2 x 60\" On bloster -seated  B 2x30\"\" B 2x30\" on step  Supine H/S stretch Bx30\"  Seated Bx30\" B 2x30\"  B x30\"   Seated nerve glide  Manual B x5   Bx10 on bolster - PT assisted  Standing - attempts x30\" B Supine nerve glide R, L x10     Supine manual hip flexor/quad stretch Bx30\"  Supine manual H/S stretch B 2x30\"  N glide R, L x20  Supine hip flexor/quad stretch off table R, L x60\" Supine manual H/S stretch Bx60\"   N. glifge R, L x10 Changed to supine to avoid neck pain  Bx10  Bx5   LTR Bx10  Bx10  Bx10   Bx10 Bx10  Bx5   Supine hip ABD+ ER  Bx10 + 30\" Bx10 + 30\"  Bx10+30\"  Bx10 + 30\" hold    Bx10 + 30\" hold on last rep    Ab set  Ab set + march + leg ext Bx5  Ab set + march B x 10 - good Standing B 2x10  Ab set + march Bx10 Standing ab set + march 2x10 Standing ab set x5 with 10\" hold  Supine ab set+ march Bx10 - cues for speed  Ab set + march B 2x5 - major cues   x5 with 5\" hold    Supine Bridge  Bx5 with 10\" hold Progressed to standing hip extension: Bx10 with initial verbal instruction/ demonstration - pain in low back  Bx10 with 10\" hold  Bx10 " "with 10\" holds  Reviewed for reps Bx12 - cues for reps  Bx10    Standing hip ABD   Bx10 B 2x10  B 2x10  Bx20 alt       Mini Squats with ab set  Bx10 Bx10   2x10       Scapular retraction    x10 with 5\" hold             Scotrun and arrow R, L after MT  Cat/cow x10 + child's pose x30\" after MT                      Amarilis Sainz, PT     "

## 2023-05-15 NOTE — ADDENDUM NOTE
Encounter addended by: Amarilis Sainz, PT on: 5/15/2023 2:06 PM   Actions taken: Clinical Note Signed, Episode resolved